# Patient Record
Sex: FEMALE | Race: BLACK OR AFRICAN AMERICAN | NOT HISPANIC OR LATINO | URBAN - METROPOLITAN AREA
[De-identification: names, ages, dates, MRNs, and addresses within clinical notes are randomized per-mention and may not be internally consistent; named-entity substitution may affect disease eponyms.]

---

## 2023-12-12 ENCOUNTER — HOSPITAL ENCOUNTER (INPATIENT)
Facility: HOSPITAL | Age: 38
LOS: 4 days | Discharge: HOME HEALTH | DRG: 871 | End: 2023-12-16
Attending: EMERGENCY MEDICINE | Admitting: EMERGENCY MEDICINE
Payer: MEDICAID

## 2023-12-12 DIAGNOSIS — E10.10 DIABETIC KETOACIDOSIS WITHOUT COMA ASSOCIATED WITH TYPE 1 DIABETES MELLITUS: ICD-10-CM

## 2023-12-12 DIAGNOSIS — E87.20 LACTIC ACIDOSIS: Primary | ICD-10-CM

## 2023-12-12 DIAGNOSIS — R07.9 CHEST PAIN: ICD-10-CM

## 2023-12-12 DIAGNOSIS — R73.9 HYPERGLYCEMIA: ICD-10-CM

## 2023-12-12 PROBLEM — E11.10 DKA (DIABETIC KETOACIDOSIS): Status: ACTIVE | Noted: 2023-12-12

## 2023-12-12 PROBLEM — R65.20 SEVERE SEPSIS: Status: ACTIVE | Noted: 2023-12-12

## 2023-12-12 PROBLEM — N17.9 AKI (ACUTE KIDNEY INJURY): Status: ACTIVE | Noted: 2023-12-12

## 2023-12-12 PROBLEM — G93.41 ACUTE METABOLIC ENCEPHALOPATHY: Status: ACTIVE | Noted: 2023-12-12

## 2023-12-12 PROBLEM — A41.9 SEVERE SEPSIS: Status: ACTIVE | Noted: 2023-12-12

## 2023-12-12 PROBLEM — E87.29 HIGH ANION GAP METABOLIC ACIDOSIS: Status: ACTIVE | Noted: 2023-12-12

## 2023-12-12 PROBLEM — D50.9 MICROCYTIC ANEMIA: Status: ACTIVE | Noted: 2023-12-12

## 2023-12-12 PROBLEM — E87.5 HYPERKALEMIA: Status: ACTIVE | Noted: 2023-12-12

## 2023-12-12 PROBLEM — E83.39 HYPERPHOSPHATEMIA: Status: ACTIVE | Noted: 2023-12-12

## 2023-12-12 LAB
ALLENS TEST: ABNORMAL
AMPHET+METHAMPHET UR QL: ABNORMAL
ANISOCYTOSIS BLD QL SMEAR: SLIGHT
B-OH-BUTYR BLD STRIP-SCNC: 5.5 MMOL/L (ref 0–0.5)
BACTERIA #/AREA URNS AUTO: NORMAL /HPF
BARBITURATES UR QL SCN>200 NG/ML: NEGATIVE
BASOPHILS # BLD AUTO: 0.07 K/UL (ref 0–0.2)
BASOPHILS NFR BLD: 0.3 % (ref 0–1.9)
BENZODIAZ UR QL SCN>200 NG/ML: NEGATIVE
BILIRUB UR QL STRIP: NEGATIVE
BUN SERPL-MCNC: 49 MG/DL (ref 6–30)
BUN SERPL-MCNC: 59 MG/DL (ref 6–30)
BURR CELLS BLD QL SMEAR: ABNORMAL
BZE UR QL SCN: NEGATIVE
CANNABINOIDS UR QL SCN: NEGATIVE
CHLORIDE SERPL-SCNC: 104 MMOL/L (ref 95–110)
CHLORIDE SERPL-SCNC: 99 MMOL/L (ref 95–110)
CK SERPL-CCNC: 80 U/L (ref 20–180)
CLARITY UR REFRACT.AUTO: CLEAR
COLOR UR AUTO: COLORLESS
CREAT SERPL-MCNC: 1.1 MG/DL (ref 0.5–1.4)
CREAT SERPL-MCNC: 1.2 MG/DL (ref 0.5–1.4)
CREAT UR-MCNC: 20 MG/DL (ref 15–325)
DIFFERENTIAL METHOD: ABNORMAL
EOSINOPHIL # BLD AUTO: 0 K/UL (ref 0–0.5)
EOSINOPHIL NFR BLD: 0 % (ref 0–8)
ERYTHROCYTE [DISTWIDTH] IN BLOOD BY AUTOMATED COUNT: 15.9 % (ref 11.5–14.5)
ESTIMATED AVG GLUCOSE: 344 MG/DL (ref 68–131)
ETHANOL UR-MCNC: <10 MG/DL
GLUCOSE SERPL-MCNC: >700 MG/DL (ref 70–110)
GLUCOSE SERPL-MCNC: >700 MG/DL (ref 70–110)
GLUCOSE UR QL STRIP: ABNORMAL
HBA1C MFR BLD: 13.6 % (ref 4–5.6)
HCG INTACT+B SERPL-ACNC: <2.4 MIU/ML
HCO3 UR-SCNC: 6.8 MMOL/L (ref 24–28)
HCT VFR BLD AUTO: 34.1 % (ref 37–48.5)
HCT VFR BLD CALC: 34 %PCV (ref 36–54)
HCT VFR BLD CALC: 38 %PCV (ref 36–54)
HCV AB SERPL QL IA: NORMAL
HGB BLD-MCNC: 10.8 G/DL (ref 12–16)
HGB UR QL STRIP: NEGATIVE
HIV 1+2 AB+HIV1 P24 AG SERPL QL IA: NORMAL
HYPOCHROMIA BLD QL SMEAR: ABNORMAL
IMM GRANULOCYTES # BLD AUTO: 0.13 K/UL (ref 0–0.04)
IMM GRANULOCYTES NFR BLD AUTO: 0.5 % (ref 0–0.5)
INFLUENZA A, MOLECULAR: NOT DETECTED
INFLUENZA B, MOLECULAR: NOT DETECTED
KETONES UR QL STRIP: ABNORMAL
LACTATE SERPL-SCNC: 4 MMOL/L (ref 0.5–2.2)
LDH SERPL L TO P-CCNC: 4.46 MMOL/L (ref 0.5–2.2)
LDH SERPL L TO P-CCNC: 4.76 MMOL/L (ref 0.5–2.2)
LEUKOCYTE ESTERASE UR QL STRIP: NEGATIVE
LYMPHOCYTES # BLD AUTO: 1 K/UL (ref 1–4.8)
LYMPHOCYTES NFR BLD: 4.3 % (ref 18–48)
MAGNESIUM SERPL-MCNC: 2.6 MG/DL (ref 1.6–2.6)
MCH RBC QN AUTO: 24.5 PG (ref 27–31)
MCHC RBC AUTO-ENTMCNC: 31.7 G/DL (ref 32–36)
MCV RBC AUTO: 77 FL (ref 82–98)
METHADONE UR QL SCN>300 NG/ML: NEGATIVE
MICROSCOPIC COMMENT: NORMAL
MONOCYTES # BLD AUTO: 0.3 K/UL (ref 0.3–1)
MONOCYTES NFR BLD: 1.2 % (ref 4–15)
NEUTROPHILS # BLD AUTO: 22.4 K/UL (ref 1.8–7.7)
NEUTROPHILS NFR BLD: 93.7 % (ref 38–73)
NITRITE UR QL STRIP: NEGATIVE
NRBC BLD-RTO: 0 /100 WBC
OPIATES UR QL SCN: NEGATIVE
OVALOCYTES BLD QL SMEAR: ABNORMAL
PCO2 BLDA: 21.1 MMHG (ref 35–45)
PCP UR QL SCN>25 NG/ML: NEGATIVE
PH SMN: 7.12 [PH] (ref 7.35–7.45)
PH UR STRIP: 5 [PH] (ref 5–8)
PHOSPHATE SERPL-MCNC: 7.1 MG/DL (ref 2.7–4.5)
PLATELET # BLD AUTO: 396 K/UL (ref 150–450)
PLATELET BLD QL SMEAR: ABNORMAL
PMV BLD AUTO: 10.9 FL (ref 9.2–12.9)
PO2 BLDA: 42 MMHG (ref 40–60)
POC BE: -23 MMOL/L
POC IONIZED CALCIUM: 0.96 MMOL/L (ref 1.06–1.42)
POC IONIZED CALCIUM: 1.16 MMOL/L (ref 1.06–1.42)
POC SATURATED O2: 63 % (ref 95–100)
POC TCO2 (MEASURED): 7 MMOL/L (ref 23–29)
POC TCO2 (MEASURED): 9 MMOL/L (ref 23–27)
POC TCO2: 7 MMOL/L (ref 24–29)
POCT GLUCOSE: 136 MG/DL (ref 70–110)
POCT GLUCOSE: 169 MG/DL (ref 70–110)
POCT GLUCOSE: 176 MG/DL (ref 70–110)
POCT GLUCOSE: 207 MG/DL (ref 70–110)
POCT GLUCOSE: 223 MG/DL (ref 70–110)
POCT GLUCOSE: 250 MG/DL (ref 70–110)
POCT GLUCOSE: 318 MG/DL (ref 70–110)
POCT GLUCOSE: >500 MG/DL (ref 70–110)
POIKILOCYTOSIS BLD QL SMEAR: SLIGHT
POLYCHROMASIA BLD QL SMEAR: ABNORMAL
POTASSIUM BLD-SCNC: 4.8 MMOL/L (ref 3.5–5.1)
POTASSIUM BLD-SCNC: 6.3 MMOL/L (ref 3.5–5.1)
PROT UR QL STRIP: NEGATIVE
PROVIDER CREDENTIALS: ABNORMAL
PROVIDER CREDENTIALS: ABNORMAL
PROVIDER NOTIFIED: ABNORMAL
PROVIDER NOTIFIED: ABNORMAL
RBC # BLD AUTO: 4.41 M/UL (ref 4–5.4)
RBC #/AREA URNS AUTO: 1 /HPF (ref 0–4)
RSV AG BY MOLECULAR METHOD: NOT DETECTED
SAMPLE: ABNORMAL
SARS-COV-2 RNA RESP QL NAA+PROBE: NOT DETECTED
SITE: ABNORMAL
SODIUM BLD-SCNC: 126 MMOL/L (ref 136–145)
SODIUM BLD-SCNC: 128 MMOL/L (ref 136–145)
SODIUM UR-SCNC: 29 MMOL/L (ref 20–250)
SP GR UR STRIP: 1.02 (ref 1–1.03)
SQUAMOUS #/AREA URNS AUTO: 5 /HPF
TIME NOTIFIED: 1103
TIME NOTIFIED: 814
TOXICOLOGY INFORMATION: ABNORMAL
URN SPEC COLLECT METH UR: ABNORMAL
UUN UR-MCNC: 272 MG/DL (ref 140–1050)
VERBAL RESULT READBACK PERFORMED: YES
VERBAL RESULT READBACK PERFORMED: YES
WBC # BLD AUTO: 23.95 K/UL (ref 3.9–12.7)
WBC #/AREA URNS AUTO: 1 /HPF (ref 0–5)
YEAST UR QL AUTO: NORMAL

## 2023-12-12 PROCEDURE — 84300 ASSAY OF URINE SODIUM: CPT | Performed by: EMERGENCY MEDICINE

## 2023-12-12 PROCEDURE — S5010 5% DEXTROSE AND 0.45% SALINE: HCPCS

## 2023-12-12 PROCEDURE — 99900035 HC TECH TIME PER 15 MIN (STAT)

## 2023-12-12 PROCEDURE — 99291 PR CRITICAL CARE, E/M 30-74 MINUTES: ICD-10-PCS | Mod: ,,, | Performed by: INTERNAL MEDICINE

## 2023-12-12 PROCEDURE — 99291 CRITICAL CARE FIRST HOUR: CPT | Mod: ,,, | Performed by: INTERNAL MEDICINE

## 2023-12-12 PROCEDURE — 63600175 PHARM REV CODE 636 W HCPCS: Performed by: EMERGENCY MEDICINE

## 2023-12-12 PROCEDURE — 84540 ASSAY OF URINE/UREA-N: CPT | Performed by: EMERGENCY MEDICINE

## 2023-12-12 PROCEDURE — 83605 ASSAY OF LACTIC ACID: CPT

## 2023-12-12 PROCEDURE — 94640 AIRWAY INHALATION TREATMENT: CPT

## 2023-12-12 PROCEDURE — 82550 ASSAY OF CK (CPK): CPT

## 2023-12-12 PROCEDURE — 83036 HEMOGLOBIN GLYCOSYLATED A1C: CPT

## 2023-12-12 PROCEDURE — 25000003 PHARM REV CODE 250

## 2023-12-12 PROCEDURE — 63600175 PHARM REV CODE 636 W HCPCS

## 2023-12-12 PROCEDURE — 85025 COMPLETE CBC W/AUTO DIFF WBC: CPT | Performed by: EMERGENCY MEDICINE

## 2023-12-12 PROCEDURE — 0241U SARS-COV2 (COVID) WITH FLU/RSV BY PCR: CPT | Performed by: STUDENT IN AN ORGANIZED HEALTH CARE EDUCATION/TRAINING PROGRAM

## 2023-12-12 PROCEDURE — 80307 DRUG TEST PRSMV CHEM ANLYZR: CPT | Performed by: EMERGENCY MEDICINE

## 2023-12-12 PROCEDURE — 93010 ELECTROCARDIOGRAM REPORT: CPT | Mod: ,,, | Performed by: INTERNAL MEDICINE

## 2023-12-12 PROCEDURE — 96367 TX/PROPH/DG ADDL SEQ IV INF: CPT

## 2023-12-12 PROCEDURE — 96366 THER/PROPH/DIAG IV INF ADDON: CPT

## 2023-12-12 PROCEDURE — 20000000 HC ICU ROOM

## 2023-12-12 PROCEDURE — 86803 HEPATITIS C AB TEST: CPT | Performed by: PHYSICIAN ASSISTANT

## 2023-12-12 PROCEDURE — 94761 N-INVAS EAR/PLS OXIMETRY MLT: CPT | Mod: XB

## 2023-12-12 PROCEDURE — 81001 URINALYSIS AUTO W/SCOPE: CPT | Mod: XB | Performed by: EMERGENCY MEDICINE

## 2023-12-12 PROCEDURE — 80048 BASIC METABOLIC PNL TOTAL CA: CPT | Mod: XB | Performed by: INTERNAL MEDICINE

## 2023-12-12 PROCEDURE — 83735 ASSAY OF MAGNESIUM: CPT | Performed by: EMERGENCY MEDICINE

## 2023-12-12 PROCEDURE — 82803 BLOOD GASES ANY COMBINATION: CPT

## 2023-12-12 PROCEDURE — 99291 CRITICAL CARE FIRST HOUR: CPT

## 2023-12-12 PROCEDURE — 80048 BASIC METABOLIC PNL TOTAL CA: CPT | Mod: 91,XB

## 2023-12-12 PROCEDURE — 84100 ASSAY OF PHOSPHORUS: CPT | Performed by: EMERGENCY MEDICINE

## 2023-12-12 PROCEDURE — 87040 BLOOD CULTURE FOR BACTERIA: CPT | Mod: 59 | Performed by: EMERGENCY MEDICINE

## 2023-12-12 PROCEDURE — 25000003 PHARM REV CODE 250: Performed by: EMERGENCY MEDICINE

## 2023-12-12 PROCEDURE — 96375 TX/PRO/DX INJ NEW DRUG ADDON: CPT

## 2023-12-12 PROCEDURE — 80053 COMPREHEN METABOLIC PANEL: CPT | Performed by: EMERGENCY MEDICINE

## 2023-12-12 PROCEDURE — 93005 ELECTROCARDIOGRAM TRACING: CPT

## 2023-12-12 PROCEDURE — 84702 CHORIONIC GONADOTROPIN TEST: CPT

## 2023-12-12 PROCEDURE — 93010 EKG 12-LEAD: ICD-10-PCS | Mod: ,,, | Performed by: INTERNAL MEDICINE

## 2023-12-12 PROCEDURE — 87389 HIV-1 AG W/HIV-1&-2 AB AG IA: CPT | Performed by: PHYSICIAN ASSISTANT

## 2023-12-12 PROCEDURE — 82010 KETONE BODYS QUAN: CPT | Performed by: EMERGENCY MEDICINE

## 2023-12-12 PROCEDURE — 25000242 PHARM REV CODE 250 ALT 637 W/ HCPCS: Performed by: EMERGENCY MEDICINE

## 2023-12-12 PROCEDURE — 96361 HYDRATE IV INFUSION ADD-ON: CPT

## 2023-12-12 PROCEDURE — 96365 THER/PROPH/DIAG IV INF INIT: CPT

## 2023-12-12 RX ORDER — ONDANSETRON 2 MG/ML
4 INJECTION INTRAMUSCULAR; INTRAVENOUS EVERY 6 HOURS PRN
Status: DISCONTINUED | OUTPATIENT
Start: 2023-12-12 | End: 2023-12-16 | Stop reason: HOSPADM

## 2023-12-12 RX ORDER — HEPARIN SODIUM 5000 [USP'U]/ML
5000 INJECTION, SOLUTION INTRAVENOUS; SUBCUTANEOUS EVERY 8 HOURS
Status: DISCONTINUED | OUTPATIENT
Start: 2023-12-12 | End: 2023-12-13

## 2023-12-12 RX ORDER — SODIUM CHLORIDE, SODIUM LACTATE, POTASSIUM CHLORIDE, CALCIUM CHLORIDE 600; 310; 30; 20 MG/100ML; MG/100ML; MG/100ML; MG/100ML
INJECTION, SOLUTION INTRAVENOUS CONTINUOUS
Status: DISCONTINUED | OUTPATIENT
Start: 2023-12-12 | End: 2023-12-13

## 2023-12-12 RX ORDER — ACETAMINOPHEN 325 MG/1
650 TABLET ORAL EVERY 4 HOURS PRN
Status: DISCONTINUED | OUTPATIENT
Start: 2023-12-12 | End: 2023-12-14

## 2023-12-12 RX ORDER — SODIUM CHLORIDE 0.9 % (FLUSH) 0.9 %
10 SYRINGE (ML) INJECTION
Status: DISCONTINUED | OUTPATIENT
Start: 2023-12-12 | End: 2023-12-16 | Stop reason: HOSPADM

## 2023-12-12 RX ORDER — DEXTROSE MONOHYDRATE AND SODIUM CHLORIDE 5; .45 G/100ML; G/100ML
INJECTION, SOLUTION INTRAVENOUS CONTINUOUS PRN
Status: DISCONTINUED | OUTPATIENT
Start: 2023-12-12 | End: 2023-12-12

## 2023-12-12 RX ORDER — SODIUM CHLORIDE 9 MG/ML
125 INJECTION, SOLUTION INTRAVENOUS CONTINUOUS
Status: DISCONTINUED | OUTPATIENT
Start: 2023-12-12 | End: 2023-12-12

## 2023-12-12 RX ORDER — SODIUM CHLORIDE 9 MG/ML
1000 INJECTION, SOLUTION INTRAVENOUS CONTINUOUS
Status: DISCONTINUED | OUTPATIENT
Start: 2023-12-12 | End: 2023-12-12

## 2023-12-12 RX ORDER — ALBUTEROL SULFATE 2.5 MG/.5ML
2.5 SOLUTION RESPIRATORY (INHALATION)
Status: COMPLETED | OUTPATIENT
Start: 2023-12-12 | End: 2023-12-12

## 2023-12-12 RX ORDER — DEXTROSE MONOHYDRATE AND SODIUM CHLORIDE 5; .45 G/100ML; G/100ML
125 INJECTION, SOLUTION INTRAVENOUS CONTINUOUS PRN
Status: DISCONTINUED | OUTPATIENT
Start: 2023-12-12 | End: 2023-12-14

## 2023-12-12 RX ADMIN — INSULIN DETEMIR 10 UNITS: 100 INJECTION, SOLUTION SUBCUTANEOUS at 08:12

## 2023-12-12 RX ADMIN — DEXTROSE AND SODIUM CHLORIDE 125 ML/HR: 5; 450 INJECTION, SOLUTION INTRAVENOUS at 08:12

## 2023-12-12 RX ADMIN — ALBUTEROL SULFATE 2.5 MG: 2.5 SOLUTION RESPIRATORY (INHALATION) at 07:12

## 2023-12-12 RX ADMIN — INSULIN HUMAN 0.1 UNITS/KG/HR: 1 INJECTION, SOLUTION INTRAVENOUS at 08:12

## 2023-12-12 RX ADMIN — INSULIN HUMAN 0.2 UNITS/KG/HR: 1 INJECTION, SOLUTION INTRAVENOUS at 01:12

## 2023-12-12 RX ADMIN — HEPARIN SODIUM 5000 UNITS: 5000 INJECTION INTRAVENOUS; SUBCUTANEOUS at 08:12

## 2023-12-12 RX ADMIN — SODIUM CHLORIDE, POTASSIUM CHLORIDE, SODIUM LACTATE AND CALCIUM CHLORIDE: 600; 310; 30; 20 INJECTION, SOLUTION INTRAVENOUS at 08:12

## 2023-12-12 RX ADMIN — PIPERACILLIN SODIUM AND TAZOBACTAM SODIUM 4.5 G: 4; .5 INJECTION, POWDER, FOR SOLUTION INTRAVENOUS at 11:12

## 2023-12-12 RX ADMIN — INSULIN HUMAN 0.1 UNITS/KG/HR: 1 INJECTION, SOLUTION INTRAVENOUS at 06:12

## 2023-12-12 RX ADMIN — VANCOMYCIN HYDROCHLORIDE 1250 MG: 1.25 INJECTION, POWDER, LYOPHILIZED, FOR SOLUTION INTRAVENOUS at 09:12

## 2023-12-12 RX ADMIN — SODIUM CHLORIDE 1000 ML: 9 INJECTION, SOLUTION INTRAVENOUS at 08:12

## 2023-12-12 RX ADMIN — SODIUM CHLORIDE, POTASSIUM CHLORIDE, SODIUM LACTATE AND CALCIUM CHLORIDE: 600; 310; 30; 20 INJECTION, SOLUTION INTRAVENOUS at 05:12

## 2023-12-12 RX ADMIN — PIPERACILLIN SODIUM AND TAZOBACTAM SODIUM 4.5 G: 4; .5 INJECTION, POWDER, FOR SOLUTION INTRAVENOUS at 08:12

## 2023-12-12 NOTE — ED NOTES
I-STAT Chem-8+ Results:   Value Reference Range   Sodium 128 136-145 mmol/L   Potassium  6.3 3.5-5.1 mmol/L   Chloride 104  mmol/L   Ionized Calcium 0.96 1.06-1.42 mmol/L   CO2 (measured) 9 23-29 mmol/L   Glucose >700  mg/dL   BUN 59 6-30 mg/dL   Creatinine 1.1 0.5-1.4 mg/dL   Hematocrit 38 36-54%

## 2023-12-12 NOTE — ASSESSMENT & PLAN NOTE
BG >500 on multiple POCT glucose. BHB elevated. A1c of 13.6.     - start insulin gtt  - BMP q4  - replete K as needed  - consider bicarb if worsening acidosis  - nutrition consulted

## 2023-12-12 NOTE — ASSESSMENT & PLAN NOTE
Phos 7.1 on admission. Likely elevated in the setting of metabolic acidosis and renal dysfunction    - trend

## 2023-12-12 NOTE — HPI
This is a 36 year old lady with history of diabetes who presented to the ED for altered mental status. History obtained primarily from chart review. Per EMS pt was 98% on room air with wheezing. Duoneb given enroute with improvement. Per EMS pt CBG came back as greater than 600 and is 913 on admission. Pt is unconscious and non verbal in the ED, she is not answering questions and is unrousable. Per ED note her family was concerned because she seemed a bit out of it. Per chart review she has not been taking her insulin lately.    In the ED, patietn was tachycardia, tachypnea, elevated WBC, and elevated lactic in the setting of DKA. Unclear source of infection as UA and CXR were unremarkable. Patient was admitted to ICU for further management.

## 2023-12-12 NOTE — ED NOTES
Care assumed from DONOVAN Rubi and Courtney ED Paramedic    APPEARANCE: drowsy/lethargic, unkempt. Unable to quantify pain score at this time. Remains on cont cardiac monitoring, auto BP cuff, and cont pulse ox. Changed into hospital gown. Bed in lowest and locked position w/ side rails up x2.   SKIN: warm, dry. +scattered scabbed lesions to BUE and BLE. Mucous membranes dry, acyanotic.    MUSCULOSKELETAL: +generalized weakness. Patient moving all extremities spontaneously, no obvious swelling or deformities noted. Ambulates independently.  RESPIRATORY: +tachypnea. Airway open and patent. Denies shortness of breath. Respirations unlabored.   CARDIAC: +tachycardia. Denies CP, 2+ distal pulses; no peripheral edema  ABDOMEN: S/ND/NT, Denies nausea. +polyphagia and polydipsia   : Pt voids spontaneously, +polyuria   NEUROLOGIC: AAO x 3; arousal to noxious stimuli. Follows commands appropriately. Equal strength in all extremities; denies numbness/tingling.     Patient remains on female external urinary catheter for urine sample collection.

## 2023-12-12 NOTE — ED PROVIDER NOTES
Encounter Date: 12/12/2023       History     Chief Complaint   Patient presents with    Altered Mental Status     Pt coming from home after family called EMS for altered mental status. Per EMS pt was 98% on room air with wheezing. Duoneb given enroute with improvement. Per EMS pt CBG came back as greater than 600. Pt non-verbal with EMS     36-year-old female with history of diabetes comes in concerned about her sugar.  She states her sugars high and she is thirsty.  Her family was concerned because she seemed a bit out of it.  Denies pain in chest abdomen or head.  No fevers at home.  She states she has not been taking her insulin lately.        Review of patient's allergies indicates:  Not on File  No past medical history on file.  No past surgical history on file.  No family history on file.     Review of Systems    Physical Exam     Initial Vitals [12/12/23 0621]   BP Pulse Resp Temp SpO2   (!) 144/85 (!) 112 (!) 24 98.9 °F (37.2 °C) 98 %      MAP       --         Physical Exam    Constitutional:   Patient is alert and conversant but hard to direct her conversation.  She looks dry and tachypneic   HENT:   Head: Normocephalic.   Eyes: Conjunctivae are normal. Pupils are equal, round, and reactive to light.   Neck: Neck supple. No JVD present.   Normal range of motion.  Cardiovascular:  Regular rhythm and normal heart sounds.           Tachycardic   Pulmonary/Chest: Breath sounds normal. No respiratory distress. She has no wheezes. She has no rhonchi. She has no rales.   Abdominal: Abdomen is soft. Bowel sounds are normal. She exhibits no distension. There is no abdominal tenderness. There is no rebound.   Musculoskeletal:         General: No edema. Normal range of motion.      Cervical back: Normal range of motion and neck supple.     Neurological: She is alert. She has normal strength. No cranial nerve deficit or sensory deficit.   Skin: Skin is warm. No rash noted.       Procedures  Labs Reviewed   CBC W/ AUTO  DIFFERENTIAL - Abnormal; Notable for the following components:       Result Value    WBC 23.95 (*)     Hemoglobin 10.8 (*)     Hematocrit 34.1 (*)     MCV 77 (*)     MCH 24.5 (*)     MCHC 31.7 (*)     RDW 15.9 (*)     Gran # (ANC) 22.4 (*)     Immature Grans (Abs) 0.13 (*)     Gran % 93.7 (*)     Lymph % 4.3 (*)     Mono % 1.2 (*)     All other components within normal limits   COMPREHENSIVE METABOLIC PANEL - Abnormal; Notable for the following components:    Sodium 132 (*)     Potassium 6.6 (*)     Chloride 94 (*)     CO2 5 (*)     Glucose 914 (*)     BUN 55 (*)     Creatinine 2.0 (*)     Albumin 3.4 (*)     Alkaline Phosphatase 265 (*)     ALT 53 (*)     eGFR 32.4 (*)     Anion Gap 33 (*)     All other components within normal limits   URINALYSIS, REFLEX TO URINE CULTURE - Abnormal; Notable for the following components:    Color, UA Colorless (*)     Glucose, UA 4+ (*)     Ketones, UA 2+ (*)     All other components within normal limits    Narrative:     Specimen Source->Urine   PHOSPHORUS - Abnormal; Notable for the following components:    Phosphorus 7.1 (*)     All other components within normal limits   BETA - HYDROXYBUTYRATE, SERUM - Abnormal; Notable for the following components:    Beta-Hydroxybutyrate 5.5 (*)     All other components within normal limits   ISTAT PROCEDURE - Abnormal; Notable for the following components:    POC PH 7.119 (*)     POC PCO2 21.1 (*)     POC HCO3 6.8 (*)     POC BE -23 (*)     POC TCO2 7 (*)     All other components within normal limits   ISTAT LACTATE - Abnormal; Notable for the following components:    POC Lactate 4.46 (*)     All other components within normal limits   ISTAT PROCEDURE - Abnormal; Notable for the following components:    POC Glucose >700 (*)     POC BUN 59 (*)     POC Sodium 128 (*)     POC Potassium 6.3 (*)     POC TCO2 (MEASURED) 9 (*)     POC Ionized Calcium 0.96 (*)     All other components within normal limits   POCT GLUCOSE - Abnormal; Notable for the  following components:    POCT Glucose >500 (*)     All other components within normal limits   ISTAT LACTATE - Abnormal; Notable for the following components:    POC Lactate 4.76 (*)     All other components within normal limits   ISTAT PROCEDURE - Abnormal; Notable for the following components:    POC Glucose >700 (*)     POC BUN 49 (*)     POC Sodium 126 (*)     POC TCO2 (MEASURED) 7 (*)     POC Hematocrit 34 (*)     All other components within normal limits   CULTURE, BLOOD   CULTURE, BLOOD   MAGNESIUM   HIV 1 / 2 ANTIBODY    Narrative:     Release to patient->Immediate   HEPATITIS C ANTIBODY    Narrative:     Release to patient->Immediate   BASIC METABOLIC PANEL   MAGNESIUM   PHOSPHORUS   URINALYSIS MICROSCOPIC    Narrative:     Specimen Source->Urine   BASIC METABOLIC PANEL   HEMOGLOBIN A1C   LACTIC ACID, PLASMA   BASIC METABOLIC PANEL   HCG, QUANTITATIVE   POCT URINE PREGNANCY   ISTAT CHEM8   POCT GLUCOSE MONITORING CONTINUOUS     EKG Readings: (Independently Interpreted)   Sinus tachycardia 113 without acute ischemic changes.  No signs of hyperkalemia.     ECG Results              EKG 12-lead (Final result)  Result time 12/12/23 10:43:46      Final result by Interface, Lab In [facility] (12/12/23 10:43:46)                   Narrative:    Test Reason : R73.9,    Vent. Rate : 113 BPM     Atrial Rate : 113 BPM     P-R Int : 162 ms          QRS Dur : 088 ms      QT Int : 350 ms       P-R-T Axes : 074 090 039 degrees     QTc Int : 480 ms    Probably  Sinus tachycardia  Possible Left atrial enlargement  Rightward axis  Possible  Anteroseptal infarct ,age undetermined  Abnormal ECG  No previous ECGs available  Confirmed by Venkat AGUILAR MD ([id number]) on 12/12/2023 10:43:36 AM    Referred By: [email or web address]   SELF           Confirmed By:Venkat AGUILAR MD                                  Imaging Results              X-Ray Chest AP Portable (Final result)  Result time 12/12/23 07:04:41      Final result by Natanael Reeder MD (12/12/23  07:04:41)                   Impression:      No significant intrathoracic abnormality.      Electronically signed by: Natanael Reeder MD  Date:    12/12/2023  Time:    07:04               Narrative:    EXAMINATION:  XR CHEST AP PORTABLE    CLINICAL HISTORY:  hyperglycemia;    TECHNIQUE:  One view    COMPARISON:  No prior chest radiographs are currently available for comparison purposes.  Clinical information of altered mental status/hyperglycemia.    FINDINGS:  Heart size is normal, as is the appearance of the pulmonary vascularity.  Lung zones are clear, and are free of significant airspace consolidation or volume loss.  No pleural fluid.  No hilar or mediastinal mass lesion.  No pneumothorax.                                       Medications   sodium chloride 0.9% flush 10 mL (has no administration in time range)   dextrose 10% bolus 125 mL 125 mL (has no administration in time range)   sodium chloride 0.9% flush 10 mL (has no administration in time range)   heparin (porcine) injection 5,000 Units (has no administration in time range)   sodium chloride 0.9% flush 10 mL (has no administration in time range)   0.9%  NaCl infusion (has no administration in time range)   dextrose 5 % and 0.45 % NaCl infusion (has no administration in time range)   ondansetron injection 4 mg (has no administration in time range)   acetaminophen tablet 650 mg (has no administration in time range)   insulin regular in 0.9 % NaCl 100 unit/100 mL (1 unit/mL) infusion (has no administration in time range)   dextrose 10% bolus 125 mL 125 mL (has no administration in time range)   dextrose 10% bolus 250 mL 250 mL (has no administration in time range)   vancomycin - pharmacy to dose (has no administration in time range)   piperacillin-tazobactam (ZOSYN) 4.5 g in dextrose 5 % in water (D5W) 100 mL IVPB (MB+) (has no administration in time range)   sodium chloride 0.9% bolus 1,000 mL 1,000 mL (1,000 mLs Intravenous Bolus from Bag 12/12/23 0800)    insulin regular bolus from bag/infusion 5.2 Units 5.2 mL (5.2 Units Intravenous Bolus from Bag 12/12/23 0830)   albuterol sulfate nebulizer solution 2.5 mg (2.5 mg Nebulization Given 12/12/23 8177)   vancomycin 1,250 mg in dextrose 5 % (D5W) 250 mL IVPB (Vial-Mate) (0 mg Intravenous Stopped 12/12/23 1154)   piperacillin-tazobactam (ZOSYN) 4.5 g in dextrose 5 % in water (D5W) 100 mL IVPB (MB+) (0 g Intravenous Stopped 12/12/23 0906)   sodium chloride 0.9% bolus 1,000 mL 1,000 mL (0 mLs Intravenous Stopped 12/12/23 1152)     Medical Decision Making  Patient with signs and symptoms concerning for DKA.  I-STAT reveals a pH of 7.1.  PCO2 is 20.  She has a potassium of 6.  She has markedly elevated blood sugar.    Insulin infusion with bolus was ordered.  Albuterol nebs were given for hyperkalemia.    White count of 23,000.  Lactate of 4.  2 L of normal saline ordered.  Broad-spectrum IV antibiotics ordered.  Chest x-ray showed no infiltrate.  Urinalysis pending.  No other obvious source of infection seen.    I consulted critical care for persistent lactic acidosis.  Chemistries are pending.    Amount and/or Complexity of Data Reviewed  Labs: ordered.  Radiology: ordered.    Risk  Prescription drug management.  Decision regarding hospitalization.              Attending Attestation:         Attending Critical Care:   Critical Care Times:   Direct Patient Care (initial evaluation, reassessments, and time considering the case)................................................................32 minutes.   Additional History from reviewing old medical records or taking additional history from the family, EMS, PCP, etc.......................3 minutes.   Ordering, Reviewing, and Interpreting Diagnostic Studies...............................................................................................................3 minutes.    Documentation..................................................................................................................................................................................4 minutes.   Consultation with other Physicians. .................................................................................................................................................4 minutes.   ==============================================================  Total Critical Care Time - exclusive of procedural time: 46 minutes.  ==============================================================                                 Clinical Impression:  Final diagnoses:  [R73.9] Hyperglycemia  [E87.20] Lactic acidosis (Primary)  [E10.10] Diabetic ketoacidosis without coma associated with type 1 diabetes mellitus          ED Disposition Condition    Admit                 Silvestre Pulliam MD  12/12/23 1246

## 2023-12-12 NOTE — SUBJECTIVE & OBJECTIVE
No past medical history on file.    No past surgical history on file.    Review of patient's allergies indicates:  Not on File    Family History    None       Tobacco Use    Smoking status: Not on file    Smokeless tobacco: Not on file   Substance and Sexual Activity    Alcohol use: Not on file    Drug use: Not on file    Sexual activity: Not on file      Review of Systems   Unable to perform ROS: Acuity of condition (pt unconscious)     Objective:     Vital Signs (Most Recent):  Temp: 98.9 °F (37.2 °C) (12/12/23 0621)  Pulse: (!) 111 (12/12/23 1201)  Resp: (!) 29 (12/12/23 1102)  BP: (!) 163/100 (12/12/23 1201)  SpO2: 100 % (12/12/23 1201) Vital Signs (24h Range):  Temp:  [98.9 °F (37.2 °C)] 98.9 °F (37.2 °C)  Pulse:  [] 111  Resp:  [22-29] 29  SpO2:  [95 %-100 %] 100 %  BP: (113-163)/() 163/100   Weight: 52 kg (114 lb 10.2 oz)  There is no height or weight on file to calculate BMI.      Intake/Output Summary (Last 24 hours) at 12/12/2023 1246  Last data filed at 12/12/2023 1235  Gross per 24 hour   Intake 1400.5 ml   Output --   Net 1400.5 ml          Physical Exam  Constitutional:       General: She is not in acute distress.     Appearance: Normal appearance. She is ill-appearing and diaphoretic.      Comments: disheveled   HENT:      Head: Normocephalic and atraumatic.      Right Ear: External ear normal.      Left Ear: External ear normal.      Nose: Nose normal. No congestion or rhinorrhea.      Mouth/Throat:      Mouth: Mucous membranes are dry.      Pharynx: Oropharynx is clear. No oropharyngeal exudate or posterior oropharyngeal erythema.   Eyes:      General: No scleral icterus.     Extraocular Movements: Extraocular movements intact.      Conjunctiva/sclera: Conjunctivae normal.   Neck:      Vascular: No carotid bruit.   Cardiovascular:      Rate and Rhythm: Regular rhythm. Tachycardia present.      Pulses: Normal pulses.      Heart sounds: Normal heart sounds. No murmur heard.     No friction  rub.   Pulmonary:      Effort: Pulmonary effort is normal. No respiratory distress.      Breath sounds: Normal breath sounds. No stridor. No wheezing.   Chest:      Chest wall: No tenderness.   Abdominal:      General: Abdomen is flat. Bowel sounds are normal. There is no distension.      Palpations: There is no mass.      Tenderness: There is no right CVA tenderness, left CVA tenderness or guarding.   Musculoskeletal:         General: No swelling, tenderness or deformity. Normal range of motion.      Cervical back: Normal range of motion. No rigidity or tenderness.      Right lower leg: No edema.      Left lower leg: No edema.   Skin:     General: Skin is warm.      Capillary Refill: Capillary refill takes less than 2 seconds.      Coloration: Skin is not jaundiced or pale.      Findings: Bruising present. No erythema.   Neurological:      Mental Status: She is alert. She is disoriented.   Psychiatric:      Comments: Unable to assess            Vents:     Lines/Drains/Airways       Drain  Duration             Female External Urinary Catheter 12/12/23 0642 <1 day              Peripheral Intravenous Line  Duration                  Peripheral IV - Single Lumen 12/12/23 0643 22 G Left Hand <1 day         Peripheral IV - Single Lumen 12/12/23 0807 20 G Left Upper Arm <1 day         Peripheral IV - Single Lumen 12/12/23 1152 18 G Right Antecubital <1 day                  Significant Labs:    CBC/Anemia Profile:  Recent Labs   Lab 12/12/23  0709 12/12/23  0739 12/12/23  0945   WBC 23.95*  --   --    HGB 10.8*  --   --    HCT 34.1* 38 34*     --   --    MCV 77*  --   --    RDW 15.9*  --   --         Chemistries:  Recent Labs   Lab 12/12/23  0709   *   K 6.6*   CL 94*   CO2 5*   BUN 55*   CREATININE 2.0*   CALCIUM 9.7   ALBUMIN 3.4*   PROT 7.9   BILITOT 0.2   ALKPHOS 265*   ALT 53*   AST 21   MG 2.6   PHOS 7.1*       All pertinent labs within the past 24 hours have been reviewed.    Significant Imaging: I have  reviewed all pertinent imaging results/findings within the past 24 hours.

## 2023-12-12 NOTE — ASSESSMENT & PLAN NOTE
36 yoF with unknown past medical history presented with tachycardia, tachypnea, elevated WBC, and elevated lactic in the setting of DKA. Unclear source of infection as UA and CXR were unremarkable. History was minimal as patient is encephalopathic.     - IVF  - start Vanc and zosyn  - f/u Bcx and tailor antibiotics  - trend lactic

## 2023-12-12 NOTE — ASSESSMENT & PLAN NOTE
BG >500 on multiple POCT glucose. BHB elevated. A1c of 13.6. repeat labs showed closed gap and acidosis mostly resolved.     - patient transitioned to basal bolus insulin  - BMP q4  - replete K as needed  - nutrition consulted

## 2023-12-12 NOTE — ASSESSMENT & PLAN NOTE
Hgb of 10 and MCV of 77. No evidence of bleeding    - f/u iron studies  - trend Hgb and transfuse <7

## 2023-12-12 NOTE — CONSULTS
Patient to be admitted to the ICU. Full H&P to follow.     Mónica Stringer MD PGY-3  Critical Care Medicine

## 2023-12-12 NOTE — ASSESSMENT & PLAN NOTE
K of 6.6 on admission. EKG showed sinus tach and no peaked t waves noted.     resolved    - BMP q4  - calcium gluconate for cardioprotection

## 2023-12-12 NOTE — PROGRESS NOTES
Pharmacokinetic Initial Assessment: IV Vancomycin    Assessment/Plan:    Initiate IV vancomycin with loading dose of 1250 mg once with subsequent doses when random concentrations are less than 20 mcg/mL    Draw vancomycin random level with morning labs on 12/13. Desired empiric serum trough concentration is 10 to 20 mcg/mL    Pharmacy will continue to follow and monitor vancomycin.      Please contact pharmacy at extension [phone] with any questions regarding this assessment.     Thank you for the consult,   Ana Zambrano, PharmD, BCCCP                 Patient brief summary:  Wilfrido Elias is a 36 y.o. female initiated on antimicrobial therapy with IV vancomycin for treatment of suspected sepsis    Drug Allergies:   Review of patient's allergies indicates:  Not on File    Actual Body Weight:   52 kg     Renal Function:   CrCl estimated to be 33.3 mL/min     Dialysis Method (if applicable):  N/A    CBC (last 72 hours):  Recent Labs   Lab Result Units 12/12/23  0709 12/12/23  1247   WBC K/uL 23.95*  --    Hemoglobin g/dL 10.8*  --    Hemoglobin A1C %  --  13.6*   Hematocrit % 34.1*  --    Platelets K/uL 396  --    Gran % % 93.7*  --    Lymph % % 4.3*  --    Mono % % 1.2*  --    Eosinophil % % 0.0  --    Basophil % % 0.3  --    Differential Method  Automated  --        Metabolic Panel (last 72 hours):  Recent Labs   Lab Result Units 12/12/23  0709 12/12/23  1101 12/12/23  1257   Sodium mmol/L 132*  --  130*   Potassium mmol/L 6.6*  --  4.9   Chloride mmol/L 94*  --  96   CO2 mmol/L 5*  --  8*   Glucose mg/dL 914*  --  799*   Glucose, UA   --  4+*  --    BUN mg/dL 55*  --  58*   Creatinine mg/dL 2.0*  --  1.9*   Creatinine, Urine mg/dL  --  20.0  --    Albumin g/dL 3.4*  --   --    Total Bilirubin mg/dL 0.2  --   --    Alkaline Phosphatase U/L 265*  --   --    AST U/L 21  --   --    ALT U/L 53*  --   --    Magnesium mg/dL 2.6  --   --    Phosphorus mg/dL 7.1*  --   --        Drug levels (last 3 results):  No  "results for input(s): "VANCOMYCINRA", "VANCORANDOM", "VANCOMYCINPE", "VANCOPEAK", "VANCOMYCINTR", "VANCOTROUGH" in the last 72 hours.    Microbiologic Results:  Microbiology Results (last 7 days)       Procedure Component Value Units Date/Time    Blood Culture #1 **CANNOT BE ORDERED STAT** [[id number]] Collected: 12/12/23 0810    Order Status: Completed Specimen: Blood from Peripheral, Antecubital, Left Updated: 12/12/23 1515     Blood Culture, Routine No Growth to date    Blood Culture #2 **CANNOT BE ORDERED STAT** [[id number]] Collected: 12/12/23 0810    Order Status: Completed Specimen: Blood from Peripheral, Antecubital, Right Updated: 12/12/23 1515     Blood Culture, Routine No Growth to date            "

## 2023-12-12 NOTE — ED NOTES
"Pt transported to floor with RN & telemetry, Insulin infusing at 0.1 ordered rate. Attempted to handoff insulin drip with receiving floor RN DONOVAN Yanes refused "you can just chart it"- informed RN that it was ED protocol. Insulin drip was paused and disconnected from pt for ambulation purposes. ED charge Christos made aware.   "

## 2023-12-12 NOTE — ASSESSMENT & PLAN NOTE
36 yoF with unknown past medical history presented with tachycardia, tachypnea, elevated WBC, and elevated lactic in the setting of DKA. Unclear source of infection as UA and CXR were unremarkable. History was minimal as patient is encephalopathic. Likely source is multiple skin infections noted on patient.     - d/c vanc and zosyn and start doxycycline  - f/u Bcx and tailor antibiotics  - trend lactic

## 2023-12-12 NOTE — ASSESSMENT & PLAN NOTE
Cr 2.0. likely prerenal ANNA in the setting of dehydration and DKA.     - IVF  - f/u urine studies  - f/u renal US  - trend Cr  - avoid nephrotoxic medication and renally dose

## 2023-12-12 NOTE — H&P
[facility] Emergency Dept  Critical Care Medicine  History & Physical    Patient Name: Wilfrido Elias  MRN: [id number]  Admission Date: 12/12/2023  Hospital Length of Stay: 0 days  Code Status: Full Code  Attending Physician: Noah Leonard MD   Primary Care Provider: No primary care provider on file.   Principal Problem: Severe sepsis    Subjective:     HPI:  This is a 36 year old lady with history of diabetes who presented to the ED for altered mental status. History obtained primarily from chart review. Per EMS pt was 98% on room air with wheezing. Duoneb given enroute with improvement. Per EMS pt CBG came back as greater than 600 and is 913 on admission. Pt is unconscious and non verbal in the ED, she is not answering questions and is unrousable. Per ED note her family was concerned because she seemed a bit out of it. Per chart review she has not been taking her insulin lately.    In the ED, patietn was tachycardia, tachypnea, elevated WBC, and elevated lactic in the setting of DKA. Unclear source of infection as UA and CXR were unremarkable. Patient was admitted to ICU for further management.     Hospital/ICU Course:  No notes on file     No past medical history on file.    No past surgical history on file.    Review of patient's allergies indicates:  Not on File    Family History    None       Tobacco Use    Smoking status: Not on file    Smokeless tobacco: Not on file   Substance and Sexual Activity    Alcohol use: Not on file    Drug use: Not on file    Sexual activity: Not on file      Review of Systems   Unable to perform ROS: Acuity of condition (pt unconscious)     Objective:     Vital Signs (Most Recent):  Temp: 98.9 °F (37.2 °C) (12/12/23 0621)  Pulse: (!) 111 (12/12/23 1201)  Resp: (!) 29 (12/12/23 1102)  BP: (!) 163/100 (12/12/23 1201)  SpO2: 100 % (12/12/23 1201) Vital Signs (24h Range):  Temp:  [98.9 °F (37.2 °C)] 98.9 °F (37.2 °C)  Pulse:  [] 111  Resp:  [22-29] 29  SpO2:  [95 %-100 %]  100 %  BP: (113-163)/() 163/100   Weight: 52 kg (114 lb 10.2 oz)  There is no height or weight on file to calculate BMI.      Intake/Output Summary (Last 24 hours) at 12/12/2023 1246  Last data filed at 12/12/2023 1235  Gross per 24 hour   Intake 1400.5 ml   Output --   Net 1400.5 ml          Physical Exam  Constitutional:       General: She is not in acute distress.     Appearance: Normal appearance. She is ill-appearing and diaphoretic.      Comments: disheveled   HENT:      Head: Normocephalic and atraumatic.      Right Ear: External ear normal.      Left Ear: External ear normal.      Nose: Nose normal. No congestion or rhinorrhea.      Mouth/Throat:      Mouth: Mucous membranes are dry.      Pharynx: Oropharynx is clear. No oropharyngeal exudate or posterior oropharyngeal erythema.   Eyes:      General: No scleral icterus.     Extraocular Movements: Extraocular movements intact.      Conjunctiva/sclera: Conjunctivae normal.   Neck:      Vascular: No carotid bruit.   Cardiovascular:      Rate and Rhythm: Regular rhythm. Tachycardia present.      Pulses: Normal pulses.      Heart sounds: Normal heart sounds. No murmur heard.     No friction rub.   Pulmonary:      Effort: Pulmonary effort is normal. No respiratory distress.      Breath sounds: Normal breath sounds. No stridor. No wheezing.   Chest:      Chest wall: No tenderness.   Abdominal:      General: Abdomen is flat. Bowel sounds are normal. There is no distension.      Palpations: There is no mass.      Tenderness: There is no right CVA tenderness, left CVA tenderness or guarding.   Musculoskeletal:         General: No swelling, tenderness or deformity. Normal range of motion.      Cervical back: Normal range of motion. No rigidity or tenderness.      Right lower leg: No edema.      Left lower leg: No edema.   Skin:     General: Skin is warm.      Capillary Refill: Capillary refill takes less than 2 seconds.      Coloration: Skin is not jaundiced or  pale.      Findings: Bruising present. No erythema.   Neurological:      Mental Status: She is alert. She is disoriented.   Psychiatric:      Comments: Unable to assess            Vents:     Lines/Drains/Airways       Drain  Duration             Female External Urinary Catheter 12/12/23 0642 <1 day              Peripheral Intravenous Line  Duration                  Peripheral IV - Single Lumen 12/12/23 0643 22 G Left Hand <1 day         Peripheral IV - Single Lumen 12/12/23 0807 20 G Left Upper Arm <1 day         Peripheral IV - Single Lumen 12/12/23 1152 18 G Right Antecubital <1 day                  Significant Labs:    CBC/Anemia Profile:  Recent Labs   Lab 12/12/23  0709 12/12/23  0739 12/12/23  0945   WBC 23.95*  --   --    HGB 10.8*  --   --    HCT 34.1* 38 34*     --   --    MCV 77*  --   --    RDW 15.9*  --   --         Chemistries:  Recent Labs   Lab 12/12/23  0709   *   K 6.6*   CL 94*   CO2 5*   BUN 55*   CREATININE 2.0*   CALCIUM 9.7   ALBUMIN 3.4*   PROT 7.9   BILITOT 0.2   ALKPHOS 265*   ALT 53*   AST 21   MG 2.6   PHOS 7.1*       All pertinent labs within the past 24 hours have been reviewed.    Significant Imaging: I have reviewed all pertinent imaging results/findings within the past 24 hours.  Assessment/Plan:     Neuro  Acute metabolic encephalopathy  Likely 2/2 to sepsis or DKA.     - continue monitoring  - obtain UDS and TSH    Renal/  Hyperphosphatemia  Phos 7.1 on admission. Likely elevated in the setting of metabolic acidosis and renal dysfunction    - trend    Lactic acidosis  See Sepsis    High anion gap metabolic acidosis  Likely 2/2 DKA vs lactic acidosis. See DKA    ANNA (acute kidney injury)  Cr 2.0. likely prerenal ANNA in the setting of dehydration and DKA.     - IVF  - f/u urine studies  - f/u renal US  - trend Cr  - avoid nephrotoxic medication and renally dose    Hyperkalemia  K of 6.6 on admission. EKG showed sinus tach and no peaked t waves noted.     - continue  insulin gtt  - BMP q4  - calcium gluconate for cardioprotection    ID  * Severe sepsis  36 yoF with unknown past medical history presented with tachycardia, tachypnea, elevated WBC, and elevated lactic in the setting of DKA. Unclear source of infection as UA and CXR were unremarkable. History was minimal as patient is encephalopathic.     - IVF  - start Vanc and zosyn  - f/u Bcx and tailor antibiotics  - trend lactic    Oncology  Microcytic anemia  Hgb of 10 and MCV of 77. No evidence of bleeding    - f/u iron studies  - trend Hgb and transfuse <7    Endocrine  DKA (diabetic ketoacidosis)  BG >500 on multiple POCT glucose. BHB elevated. A1c of 13.6.     - start insulin gtt  - BMP q4  - replete K as needed  - consider bicarb if worsening acidosis  - nutrition consulted        Critical Care Daily Checklist:    A: Awake: RASS Goal/Actual Goal:    Actual:     B: Spontaneous Breathing Trial Performed?     C: SAT & SBT Coordinated?  NA                      D: Delirium: CAM-ICU     E: Early Mobility Performed? No   F: Feeding Goal:    Status:     Current Diet Order   Procedures    Diet NPO      AS: Analgesia/Sedation none   T: Thromboembolic Prophylaxis heparin   H: HOB > 300 Yes   U: Stress Ulcer Prophylaxis (if needed) none   G: Glucose Control Insulin gtt   B: Bowel Function     I: Indwelling Catheter (Lines & Spivey) Necessity Maintain current lines   D: De-escalation of Antimicrobials/Pharmacotherapies Start BS antibiotics    Plan for the day/ETD Admit to ICU    Code Status:  Family/Goals of Care: Full Code  ongoing       Critical secondary to Patient has a condition that poses threat to life and bodily function: Acute Renal Failure    N/A  No family history on file.     Critical care was time spent personally by me on the following activities: development of treatment plan with patient or surrogate and bedside caregivers, discussions with consultants, evaluation of patient's response to treatment, examination of  patient, ordering and performing treatments and interventions, ordering and review of laboratory studies, ordering and review of radiographic studies, pulse oximetry, re-evaluation of patient's condition. This critical care time did not overlap with that of any other provider or involve time for any procedures.     Mónica Stringer MD  Critical Care Medicine  [facility] - Emergency Dept

## 2023-12-12 NOTE — ASSESSMENT & PLAN NOTE
K of 6.6 on admission. EKG showed sinus tach and no peaked t waves noted.     - continue insulin gtt  - BMP q4  - calcium gluconate for cardioprotection

## 2023-12-13 LAB
ANION GAP SERPL CALC-SCNC: 10 MMOL/L (ref 8–16)
ANION GAP SERPL CALC-SCNC: 8 MMOL/L (ref 8–16)
BASOPHILS # BLD AUTO: 0.03 K/UL (ref 0–0.2)
BASOPHILS NFR BLD: 0.2 % (ref 0–1.9)
BUN SERPL-MCNC: 20 MG/DL (ref 6–20)
BUN SERPL-MCNC: 25 MG/DL (ref 6–20)
CALCIUM SERPL-MCNC: 8.7 MG/DL (ref 8.7–10.5)
CALCIUM SERPL-MCNC: 9.1 MG/DL (ref 8.7–10.5)
CHLORIDE SERPL-SCNC: 103 MMOL/L (ref 95–110)
CHLORIDE SERPL-SCNC: 103 MMOL/L (ref 95–110)
CO2 SERPL-SCNC: 23 MMOL/L (ref 23–29)
CO2 SERPL-SCNC: 25 MMOL/L (ref 23–29)
CREAT SERPL-MCNC: 1 MG/DL (ref 0.5–1.4)
CREAT SERPL-MCNC: 1.1 MG/DL (ref 0.5–1.4)
DIFFERENTIAL METHOD: ABNORMAL
EOSINOPHIL # BLD AUTO: 0 K/UL (ref 0–0.5)
EOSINOPHIL NFR BLD: 0.1 % (ref 0–8)
ERYTHROCYTE [DISTWIDTH] IN BLOOD BY AUTOMATED COUNT: 15.4 % (ref 11.5–14.5)
EST. GFR  (NO RACE VARIABLE): >60 ML/MIN/1.73 M^2
EST. GFR  (NO RACE VARIABLE): >60 ML/MIN/1.73 M^2
FERRITIN SERPL-MCNC: 32 NG/ML (ref 20–300)
GLUCOSE SERPL-MCNC: 227 MG/DL (ref 70–110)
GLUCOSE SERPL-MCNC: 55 MG/DL (ref 70–110)
HCT VFR BLD AUTO: 24.9 % (ref 37–48.5)
HGB BLD-MCNC: 8.5 G/DL (ref 12–16)
IMM GRANULOCYTES # BLD AUTO: 0.09 K/UL (ref 0–0.04)
IMM GRANULOCYTES NFR BLD AUTO: 0.5 % (ref 0–0.5)
IRON SERPL-MCNC: 71 UG/DL (ref 30–160)
LACTATE SERPL-SCNC: 0.9 MMOL/L (ref 0.5–2.2)
LYMPHOCYTES # BLD AUTO: 1.5 K/UL (ref 1–4.8)
LYMPHOCYTES NFR BLD: 8.5 % (ref 18–48)
MAGNESIUM SERPL-MCNC: 2.1 MG/DL (ref 1.6–2.6)
MCH RBC QN AUTO: 24.4 PG (ref 27–31)
MCHC RBC AUTO-ENTMCNC: 34.1 G/DL (ref 32–36)
MCV RBC AUTO: 72 FL (ref 82–98)
MONOCYTES # BLD AUTO: 1.2 K/UL (ref 0.3–1)
MONOCYTES NFR BLD: 6.9 % (ref 4–15)
NEUTROPHILS # BLD AUTO: 14.4 K/UL (ref 1.8–7.7)
NEUTROPHILS NFR BLD: 83.8 % (ref 38–73)
NRBC BLD-RTO: 0 /100 WBC
PHOSPHATE SERPL-MCNC: 2.7 MG/DL (ref 2.7–4.5)
PLATELET # BLD AUTO: 297 K/UL (ref 150–450)
PMV BLD AUTO: 10.3 FL (ref 9.2–12.9)
POCT GLUCOSE: 114 MG/DL (ref 70–110)
POCT GLUCOSE: 159 MG/DL (ref 70–110)
POCT GLUCOSE: 189 MG/DL (ref 70–110)
POCT GLUCOSE: 189 MG/DL (ref 70–110)
POCT GLUCOSE: 215 MG/DL (ref 70–110)
POCT GLUCOSE: 51 MG/DL (ref 70–110)
POCT GLUCOSE: 52 MG/DL (ref 70–110)
POCT GLUCOSE: 64 MG/DL (ref 70–110)
POCT GLUCOSE: 86 MG/DL (ref 70–110)
POTASSIUM SERPL-SCNC: 4 MMOL/L (ref 3.5–5.1)
POTASSIUM SERPL-SCNC: 4.4 MMOL/L (ref 3.5–5.1)
RBC # BLD AUTO: 3.48 M/UL (ref 4–5.4)
SATURATED IRON: 17 % (ref 20–50)
SODIUM SERPL-SCNC: 134 MMOL/L (ref 136–145)
SODIUM SERPL-SCNC: 138 MMOL/L (ref 136–145)
TOTAL IRON BINDING CAPACITY: 416 UG/DL (ref 250–450)
TRANSFERRIN SERPL-MCNC: 281 MG/DL (ref 200–375)
TSH SERPL DL<=0.005 MIU/L-ACNC: 0.43 UIU/ML (ref 0.4–4)
VANCOMYCIN SERPL-MCNC: 11.1 UG/ML
WBC # BLD AUTO: 17.17 K/UL (ref 3.9–12.7)

## 2023-12-13 PROCEDURE — 63600175 PHARM REV CODE 636 W HCPCS

## 2023-12-13 PROCEDURE — 84466 ASSAY OF TRANSFERRIN: CPT

## 2023-12-13 PROCEDURE — 83735 ASSAY OF MAGNESIUM: CPT

## 2023-12-13 PROCEDURE — 63600175 PHARM REV CODE 636 W HCPCS: Performed by: INTERNAL MEDICINE

## 2023-12-13 PROCEDURE — 94761 N-INVAS EAR/PLS OXIMETRY MLT: CPT

## 2023-12-13 PROCEDURE — 84100 ASSAY OF PHOSPHORUS: CPT

## 2023-12-13 PROCEDURE — 85025 COMPLETE CBC W/AUTO DIFF WBC: CPT

## 2023-12-13 PROCEDURE — 82728 ASSAY OF FERRITIN: CPT

## 2023-12-13 PROCEDURE — 99291 PR CRITICAL CARE, E/M 30-74 MINUTES: ICD-10-PCS | Mod: ,,, | Performed by: INTERNAL MEDICINE

## 2023-12-13 PROCEDURE — 99291 CRITICAL CARE FIRST HOUR: CPT | Mod: ,,, | Performed by: INTERNAL MEDICINE

## 2023-12-13 PROCEDURE — 20600001 HC STEP DOWN PRIVATE ROOM

## 2023-12-13 PROCEDURE — 63600175 PHARM REV CODE 636 W HCPCS: Performed by: NURSE PRACTITIONER

## 2023-12-13 PROCEDURE — 25000003 PHARM REV CODE 250

## 2023-12-13 PROCEDURE — 80048 BASIC METABOLIC PNL TOTAL CA: CPT | Mod: XB

## 2023-12-13 PROCEDURE — 80053 COMPREHEN METABOLIC PANEL: CPT

## 2023-12-13 PROCEDURE — 80202 ASSAY OF VANCOMYCIN: CPT | Performed by: INTERNAL MEDICINE

## 2023-12-13 PROCEDURE — 84443 ASSAY THYROID STIM HORMONE: CPT

## 2023-12-13 PROCEDURE — 83605 ASSAY OF LACTIC ACID: CPT

## 2023-12-13 PROCEDURE — 25000003 PHARM REV CODE 250: Performed by: NURSE PRACTITIONER

## 2023-12-13 PROCEDURE — 83540 ASSAY OF IRON: CPT

## 2023-12-13 RX ORDER — INSULIN ASPART 100 [IU]/ML
0-10 INJECTION, SOLUTION INTRAVENOUS; SUBCUTANEOUS EVERY 4 HOURS PRN
Status: DISCONTINUED | OUTPATIENT
Start: 2023-12-13 | End: 2023-12-14

## 2023-12-13 RX ORDER — GLUCAGON 1 MG
1 KIT INJECTION
Status: DISCONTINUED | OUTPATIENT
Start: 2023-12-13 | End: 2023-12-14

## 2023-12-13 RX ORDER — MIRTAZAPINE 15 MG/1
15 TABLET, ORALLY DISINTEGRATING ORAL NIGHTLY
Status: DISCONTINUED | OUTPATIENT
Start: 2023-12-13 | End: 2023-12-16 | Stop reason: HOSPADM

## 2023-12-13 RX ORDER — ENOXAPARIN SODIUM 100 MG/ML
40 INJECTION SUBCUTANEOUS EVERY 24 HOURS
Status: DISCONTINUED | OUTPATIENT
Start: 2023-12-13 | End: 2023-12-14

## 2023-12-13 RX ORDER — INSULIN ASPART 100 [IU]/ML
6 INJECTION, SOLUTION INTRAVENOUS; SUBCUTANEOUS
Status: DISCONTINUED | OUTPATIENT
Start: 2023-12-13 | End: 2023-12-13

## 2023-12-13 RX ORDER — CALCIUM CARBONATE 200(500)MG
1000 TABLET,CHEWABLE ORAL 3 TIMES DAILY PRN
Status: DISCONTINUED | OUTPATIENT
Start: 2023-12-13 | End: 2023-12-14

## 2023-12-13 RX ORDER — INSULIN ASPART 100 [IU]/ML
8 INJECTION, SOLUTION INTRAVENOUS; SUBCUTANEOUS
Status: DISCONTINUED | OUTPATIENT
Start: 2023-12-13 | End: 2023-12-14

## 2023-12-13 RX ORDER — DOXYCYCLINE HYCLATE 100 MG
100 TABLET ORAL EVERY 12 HOURS
Status: DISCONTINUED | OUTPATIENT
Start: 2023-12-13 | End: 2023-12-16 | Stop reason: HOSPADM

## 2023-12-13 RX ADMIN — ENOXAPARIN SODIUM 40 MG: 40 INJECTION SUBCUTANEOUS at 06:12

## 2023-12-13 RX ADMIN — HEPARIN SODIUM 5000 UNITS: 5000 INJECTION INTRAVENOUS; SUBCUTANEOUS at 01:12

## 2023-12-13 RX ADMIN — INSULIN DETEMIR 20 UNITS: 100 INJECTION, SOLUTION SUBCUTANEOUS at 08:12

## 2023-12-13 RX ADMIN — DEXTROSE MONOHYDRATE 125 ML: 100 INJECTION, SOLUTION INTRAVENOUS at 06:12

## 2023-12-13 RX ADMIN — INSULIN ASPART 4 UNITS: 100 INJECTION, SOLUTION INTRAVENOUS; SUBCUTANEOUS at 11:12

## 2023-12-13 RX ADMIN — MIRTAZAPINE 15 MG: 15 TABLET, ORALLY DISINTEGRATING ORAL at 09:12

## 2023-12-13 RX ADMIN — INSULIN ASPART 2 UNITS: 100 INJECTION, SOLUTION INTRAVENOUS; SUBCUTANEOUS at 08:12

## 2023-12-13 RX ADMIN — INSULIN DETEMIR 10 UNITS: 100 INJECTION, SOLUTION SUBCUTANEOUS at 12:12

## 2023-12-13 RX ADMIN — ONDANSETRON 4 MG: 2 INJECTION INTRAMUSCULAR; INTRAVENOUS at 01:12

## 2023-12-13 RX ADMIN — MIRTAZAPINE 15 MG: 15 TABLET, ORALLY DISINTEGRATING ORAL at 11:12

## 2023-12-13 RX ADMIN — DOXYCYCLINE HYCLATE 100 MG: 100 TABLET, FILM COATED ORAL at 09:12

## 2023-12-13 RX ADMIN — INSULIN ASPART 6 UNITS: 100 INJECTION, SOLUTION INTRAVENOUS; SUBCUTANEOUS at 11:12

## 2023-12-13 RX ADMIN — DOXYCYCLINE HYCLATE 100 MG: 100 TABLET, FILM COATED ORAL at 11:12

## 2023-12-13 RX ADMIN — CALCIUM CARBONATE (ANTACID) CHEW TAB 500 MG 1000 MG: 500 CHEW TAB at 07:12

## 2023-12-13 RX ADMIN — PIPERACILLIN SODIUM AND TAZOBACTAM SODIUM 4.5 G: 4; .5 INJECTION, POWDER, FOR SOLUTION INTRAVENOUS at 08:12

## 2023-12-13 NOTE — ASSESSMENT & PLAN NOTE
Cr 2.0. likely prerenal ANNA in the setting of dehydration and DKA.     - IVF and resume diet  - trend Cr  - avoid nephrotoxic medication and renally dose

## 2023-12-13 NOTE — PLAN OF CARE
MICU DAILY GOALS     Family/Goals of care/Code Status   Code Status: Full Code    24H Vital Sign Range  Temp:  [98.1 °F (36.7 °C)-98.9 °F (37.2 °C)]   Pulse:  []   Resp:  [12-29]   BP: (113-189)/()   SpO2:  [95 %-100 %]      Shift Events (include procedures and significant events)   No acute events throughout shift. Pt went downstairs for head CT. Received bedside ultrasound of retroperitoneal region.   Insulin gtt discontinued approx 0100. Levemir administered. Accucheks now q4H.   Pt now following commands and A&Ox3 this AM. Tearful mood noted and stated she wants to go home. Diabetic diet ordered. Has had a snack and water, no reports of nausea.   Ambulated once to bathroom to void, standby assistance required.    AWAKE RASS: Goal -    Actual -      Restraint necessity: Not necessary   BREATHE SBT: Not intubated    Coordinate A & B, analgesics/sedatives Pain: managed   SAT: Not intubated   Delirium CAM-ICU: Overall CAM-ICU: Positive   Early(intubated/ Progressive (non-intubated) Mobility MOVE Screen (INTUBATED ONLY): Not intubated    Activity: Activity Management: Ankle pumps - L1, Arm raise - L1   Feeding/Nutrition Diet order: Diet/Nutrition Received: consistent carb/diabetic diet,     Thrombus DVT prophylaxis: VTE Required Core Measure: Pharmacological prophylaxis initiated/maintained   HOB Elevation Head of Bed (HOB) Positioning: HOB at 30-45 degrees   Ulcer Prophylaxis GI: yes   Glucose control managed Glycemic Management: blood glucose monitored   Skin Skin assessed during: Daily Assessment    Sacrum intact/not altered? Yes  Heels intact/not altered? Yes  Surgical wound? No    Check one (no altered skin or altered skin) and sub boxes:  [] No Altered Skin Integrity Present    []Prevention Measures Documented    [] Altered Skin Integrity Present or Discovered   [] LDA present in EPIC              [] LDA added in EPIC   [] Wound Image Taken (required on admit,                   transfer/discharge  and every Tuesday)    Wound Care Consulted? No    Attending Nurse:     Second RN/Staff Member:    Bowel Function no issues    Indwelling Catheter Necessity            De-escalation Antibiotics Yes       VS and assessment per flow sheet, patient progressing towards goals as tolerated, plan of care reviewed with patient, all concerns addressed, will continue to monitor.

## 2023-12-13 NOTE — PLAN OF CARE
CM sent email to [facility] to verify patient's Medicaid [employer] insurance.      Celi Lord RN     [phone]

## 2023-12-13 NOTE — SUBJECTIVE & OBJECTIVE
Interval History/Significant Events: patient mentating better. Complaining of significant abdominal pain. No evidence of bleeding or hematemesis    Review of Systems   Constitutional:  Negative for chills, fatigue and fever.   HENT:  Negative for ear pain and sinus pain.    Eyes:  Negative for photophobia, pain and visual disturbance.   Respiratory:  Negative for cough and shortness of breath.    Cardiovascular:  Negative for chest pain and leg swelling.   Gastrointestinal:  Positive for abdominal pain. Negative for blood in stool, constipation, diarrhea, nausea and vomiting.   Endocrine: Negative for polyuria.   Genitourinary:  Negative for difficulty urinating, dysuria, flank pain, pelvic pain and vaginal pain.   Musculoskeletal:  Negative for back pain and neck pain.   Skin:  Negative for color change and rash.   Neurological:  Negative for dizziness, weakness, light-headedness, numbness and headaches.   Psychiatric/Behavioral:  Negative for confusion and sleep disturbance.      Objective:     Vital Signs (Most Recent):  Temp: 98.6 °F (37 °C) (12/13/23 0701)  Pulse: 108 (12/13/23 1000)  Resp: 15 (12/13/23 1000)  BP: (!) 162/98 (12/13/23 1000)  SpO2: 100 % (12/13/23 1000) Vital Signs (24h Range):  Temp:  [98.1 °F (36.7 °C)-98.8 °F (37.1 °C)] 98.6 °F (37 °C)  Pulse:  [106-117] 108  Resp:  [10-29] 15  SpO2:  [96 %-100 %] 100 %  BP: (138-189)/() 162/98   Weight: 52 kg (114 lb 10.2 oz)  Body mass index is 18.5 kg/m².      Intake/Output Summary (Last 24 hours) at 12/13/2023 1023  Last data filed at 12/13/2023 1000  Gross per 24 hour   Intake 3653.34 ml   Output --   Net 3653.34 ml          Physical Exam  Constitutional:       General: She is not in acute distress.     Appearance: Normal appearance. She is not ill-appearing or diaphoretic.      Comments: disheveled   HENT:      Head: Normocephalic and atraumatic.      Right Ear: External ear normal.      Left Ear: External ear normal.      Nose: Nose normal. No  congestion or rhinorrhea.      Mouth/Throat:      Mouth: Mucous membranes are moist.      Pharynx: Oropharynx is clear. No oropharyngeal exudate or posterior oropharyngeal erythema.   Eyes:      General: No scleral icterus.     Extraocular Movements: Extraocular movements intact.      Conjunctiva/sclera: Conjunctivae normal.   Neck:      Vascular: No carotid bruit.   Cardiovascular:      Rate and Rhythm: Normal rate and regular rhythm.      Pulses: Normal pulses.      Heart sounds: Normal heart sounds. No murmur heard.     No friction rub.   Pulmonary:      Effort: Pulmonary effort is normal. No respiratory distress.      Breath sounds: Normal breath sounds. No stridor. No wheezing.   Chest:      Chest wall: No tenderness.   Abdominal:      General: Abdomen is flat. Bowel sounds are normal. There is no distension.      Palpations: There is no mass.      Tenderness: There is no right CVA tenderness, left CVA tenderness or guarding.   Musculoskeletal:         General: No swelling, tenderness or deformity. Normal range of motion.      Cervical back: Normal range of motion. No rigidity or tenderness.      Right lower leg: No edema.      Left lower leg: No edema.   Skin:     General: Skin is warm and dry.      Capillary Refill: Capillary refill takes less than 2 seconds.      Coloration: Skin is not jaundiced or pale.      Findings: Lesion present. No bruising or erythema.   Neurological:      General: No focal deficit present.      Mental Status: She is alert and oriented to person, place, and time. Mental status is at baseline.      Motor: No weakness.      Coordination: Coordination normal.   Psychiatric:         Mood and Affect: Mood normal.         Behavior: Behavior normal.         Thought Content: Thought content normal.            Vents:     Lines/Drains/Airways       Drain  Duration             Female External Urinary Catheter 12/12/23 1901 <1 day              Peripheral Intravenous Line  Duration                   Peripheral IV - Single Lumen 12/12/23 1152 18 G Right Antecubital <1 day         Peripheral IV - Single Lumen 12/12/23 1901 20 G Left Wrist <1 day                  Significant Labs:    CBC/Anemia Profile:  Recent Labs   Lab 12/12/23  0709 12/12/23  0739 12/12/23  0945 12/13/23  0322   WBC 23.95*  --   --  17.17*   HGB 10.8*  --   --  8.5*   HCT 34.1* 38 34* 24.9*     --   --  297   MCV 77*  --   --  72*   RDW 15.9*  --   --  15.4*   IRON  --   --   --  71   FERRITIN  --   --   --  32        Chemistries:  Recent Labs   Lab 12/12/23  0709 12/12/23  1257 12/12/23  2323 12/13/23  0322 12/13/23  0820   *   < > 136 134* 136   K 6.6*   < > 3.7 4.2 4.2   CL 94*   < > 105 103 100   CO2 5*   < > 25 20* 21*   BUN 55*   < > 45* 37* 30*   CREATININE 2.0*   < > 1.5* 1.3 1.2   CALCIUM 9.7   < > 8.4* 8.3* 8.9   ALBUMIN 3.4*  --   --  2.7*  --    PROT 7.9  --   --  6.1  --    BILITOT 0.2  --   --  0.3  --    ALKPHOS 265*  --   --  182*  --    ALT 53*  --   --  34  --    AST 21  --   --  16  --    MG 2.6  --   --  2.1  --    PHOS 7.1*  --   --  2.7  --     < > = values in this interval not displayed.       All pertinent labs within the past 24 hours have been reviewed.    Significant Imaging:  I have reviewed all pertinent imaging results/findings within the past 24 hours.

## 2023-12-13 NOTE — PLAN OF CARE
[facility] Card[facility] Medical [facility]  Initial Discharge Assessment       Primary Care Provider: No, Primary Doctor    Admission Diagnosis: Lactic acidosis [E87.20]  Hyperglycemia [R73.9]  Diabetic ketoacidosis without coma associated with type 1 diabetes mellitus [E10.10]    Admission Date: 12/12/2023  Expected Discharge Date: 12/17/2023    Transition of Care Barriers: Underinsured, Substance Abuse, Does not adhere to care plan    Payor: /     Extended Emergency Contact Information  Primary Emergency Contact: Maritza Ramos  Mobile Phone: [phone]  Relation: Relative  Preferred language: English   needed? No    Discharge Plan A: Home with family  Discharge Plan B: Other (TBD)    No Pharmacies Listed      Transferred from:     No past medical history on file.      CM met with patient in room for Discharge Planning Assessment.  Patient would not talk to CM or answer any  questions. CM spoke with Maritza Ramos (cousin) [phone] via phone.  Per Maritza, patient lives with her in a 2-story home with 1 step(s) to enter.   Per Maritza, patient was dependent with ADLS and used rollator for ambulation.  Per Maritza, patient is not on dialysis and does not take Coumadin.  Patient will have help from Maritza Ramos (cousin) [phone]  upon discharge.   Discharge Planning discussed with Maritza Ramos (cousin) [phone] via phone.  All questions addressed.  CM will follow for needs.      Discharge Plan A and Plan B have been determined by review of patient's clinical status, future medical and therapeutic needs, and coverage/benefits for post-acute care in coordination with multidisciplinary team members.        Initial Assessment (most recent)       Adult Discharge Assessment - 12/13/23 1501          Discharge Assessment    Assessment Type Discharge Planning Assessment     Confirmed/corrected address, phone number and insurance Yes     Confirmed Demographics Correct on Facesheet     Source of Information family      When was your last doctors appointment? --   11/2023    Communicated [facility] with patient/caregiver Date not available/Unable to determine     Reason For Admission Severe sepsis, Lactic acidosis     People in Home other relative(s)   cousin    Facility Arrived From: Home     Do you expect to return to your current living situation? Yes     Do you have help at home or someone to help you manage your care at home? Yes     Who are your caregiver(s) and their phone number(s)? Maritza Ramos (cousin) [phone]     Prior to hospitilization cognitive status: Unable to Assess   patient lethargic    Current cognitive status: Unable to Assess   patient drowsy and did not want to talk to CM    Walking or Climbing Stairs Difficulty yes     Walking or Climbing Stairs ambulation difficulty, requires equipment     Mobility Management rollator     Dressing/Bathing Difficulty yes     Dressing/Bathing bathing difficulty, assistance 1 person;dressing difficulty, dependent     Dressing/Bathing Management cousin assists in these tasks     Equipment Currently Used at Home shower chair;rollator     Readmission within 30 days? No     Patient currently being followed by outpatient case management? No     Do you currently have service(s) that help you manage your care at home? No     Do you take prescription medications? Yes     Do you have prescription coverage? Yes     Coverage Medicaid - [employer]     Do you have any problems affording any of your prescribed medications? TBD     Is the patient taking medications as prescribed? yes     Who is going to help you get home at discharge? Maritza Ramos (cousin) [phone]     How do you get to doctors appointments? family or friend will provide     Are you on dialysis? No     Do you take coumadin? No     Discharge Plan A Home with family     Discharge Plan B Other   TBD    DME Needed Upon Discharge  other (see comments)   TBD    Discharge Plan discussed with: Caregiver     Name(s) and Number(s)  Maritza Ramos (cousin) [phone]     Transition of Care Barriers Underinsured;Substance Abuse;Does not adhere to care plan        Physical Activity    On average, how many days per week do you engage in moderate to strenuous exercise (like a brisk walk)? 0 days     On average, how many minutes do you engage in exercise at this level? 0 min        Financial Resource Strain    How hard is it for you to pay for the very basics like food, housing, medical care, and heating? Patient refused        Housing Stability    In the last 12 months, was there a time when you were not able to pay the mortgage or rent on time? Patient refused     In the last 12 months, how many places have you lived? 1     In the last 12 months, was there a time when you did not have a steady place to sleep or slept in a shelter (including now)? Patient refused        Transportation Needs    In the past 12 months, has lack of transportation kept you from medical appointments or from getting medications? Patient refused     In the past 12 months, has lack of transportation kept you from meetings, work, or from getting things needed for daily living? Patient refused        Food Insecurity    Within the past 12 months, you worried that your food would run out before you got the money to buy more. Patient refused     Within the past 12 months, the food you bought just didn't last and you didn't have money to get more. Patient refused        Stress    Do you feel stress - tense, restless, nervous, or anxious, or unable to sleep at night because your mind is troubled all the time - these days? Patient refused        Social Connections    In a typical week, how many times do you talk on the phone with family, friends, or neighbors? More than three times a week   per cousin    How often do you get together with friends or relatives? More than three times a week   per cousin    How often do you attend Yarsani or Synagogue services? Never   per cousin    Do  you belong to any clubs or organizations such as Uats[employer] groups, unions, fraternal or athletic groups, or school groups? No   per cousin    How often do you attend meetings of the clubs or organizations you belong to? Never   per cousin    Are you , , , , never , or living with a partner?    per cousin. spouse not in picture       Alcohol Use    Q1: How often do you have a drink containing alcohol? Never   per cousin    Q2: How many drinks containing alcohol do you have on a typical day when you are drinking? Patient does not drink   per cousin    Q3: How often do you have six or more drinks on one occasion? Never   per cousin       OTHER    Name(s) of People in Home Maritza Ramos (cousin) [phone]                            PCP:  No, Primary Doctor  None        Pharmacy:  No Pharmacies Listed      Emergency Contacts:  Extended Emergency Contact Information  Primary Emergency Contact: Maritza Ramos  Mobile Phone: [phone]  Relation: Relative  Preferred language: English   needed? No      Insurance:    Payor: /     Celi Lord RN     [phone]      12/13/2023  3:19 PM

## 2023-12-13 NOTE — PROGRESS NOTES
Therapy with vancomycin was discontinued by the provider.  Pharmacy will sign off, please re-consult as needed.    Thank you for the consult,   Brenda Neville, PharmD, [facility]  [id number]

## 2023-12-13 NOTE — RESIDENT HANDOFF
Handoff     Primary Team: Networked reference to record [facility]  Room Number: 6083/6083 A     Patient Name: Wilfrido Elias MRN: [id number]     Date of Birth: [birth year] Allergies: Patient has no allergy information on record.     Age: 36 y.o. Admit Date: 12/12/2023     Sex: female  BMI: Body mass index is 18.5 kg/m².     Code Status: Full Code        Illness Level (current clinical status): Watcher - No    Reason for Admission: Severe sepsis    Brief HPI (pertinent PMH and diagnosis or differential diagnosis): 36 yoF with meth abuse and diabetes who was admitted to us for acute encephalopathy, DKA, and severe sepsis     Hospital Course (updated, brief assessment by system or problem, significant events): Patient was admitted to ICU for further management. BG came down with insulin gtt and gap closed. Patient was mentating better.     Contingency Plan (special circumstances anticipated and plan): if hypotensive or HD unstable, reconsult ICU    Estimated Discharge Date: per primary team    Discharge Disposition: Home or Self Care    Mentored By: Dr. Leonard

## 2023-12-13 NOTE — CONSULTS
RD consulted regarding diabetic diet education - A1C 13.6.  Per RN, pt not appropriate at this time 2/2 agitation/withdrawing from meth.    Left paperwork w/ RN; will follow-up.    Thanks!  MS Mariluz, RD, LDN

## 2023-12-13 NOTE — HOSPITAL COURSE
Patient was admitted to ICU for further management. BG came down with insulin gtt and gap closed. Patient was mentating better.

## 2023-12-13 NOTE — PROGRESS NOTES
[facility] Card[facility] Medical [facility]  Critical Care Medicine  Progress Note    Patient Name: Wilfrido Elias  MRN: [id number]  Admission Date: 12/12/2023  Hospital Length of Stay: 1 days  Code Status: Full Code  Attending Provider: Noah Leonard MD  Primary Care Provider: No, Primary Doctor   Principal Problem: Severe sepsis    Subjective:     HPI:  This is a 36 year old lady with history of diabetes who presented to the ED for altered mental status. History obtained primarily from chart review. Per EMS pt was 98% on room air with wheezing. Duoneb given enroute with improvement. Per EMS pt CBG came back as greater than 600 and is 913 on admission. Pt is unconscious and non verbal in the ED, she is not answering questions and is unrousable. Per ED note her family was concerned because she seemed a bit out of it. Per chart review she has not been taking her insulin lately.    In the ED, patietn was tachycardia, tachypnea, elevated WBC, and elevated lactic in the setting of DKA. Unclear source of infection as UA and CXR were unremarkable. Patient was admitted to ICU for further management.     Hospital/ICU Course:  Patient was admitted to ICU for further management. BG came down with insulin gtt and gap closed. Patient was mentating better.    Interval History/Significant Events: patient mentating better. Complaining of significant abdominal pain. No evidence of bleeding or hematemesis    Review of Systems   Constitutional:  Negative for chills, fatigue and fever.   HENT:  Negative for ear pain and sinus pain.    Eyes:  Negative for photophobia, pain and visual disturbance.   Respiratory:  Negative for cough and shortness of breath.    Cardiovascular:  Negative for chest pain and leg swelling.   Gastrointestinal:  Positive for abdominal pain. Negative for blood in stool, constipation, diarrhea, nausea and vomiting.   Endocrine: Negative for polyuria.   Genitourinary:  Negative for difficulty urinating, dysuria, flank  pain, pelvic pain and vaginal pain.   Musculoskeletal:  Negative for back pain and neck pain.   Skin:  Negative for color change and rash.   Neurological:  Negative for dizziness, weakness, light-headedness, numbness and headaches.   Psychiatric/Behavioral:  Negative for confusion and sleep disturbance.      Objective:     Vital Signs (Most Recent):  Temp: 98.6 °F (37 °C) (12/13/23 0701)  Pulse: 108 (12/13/23 1000)  Resp: 15 (12/13/23 1000)  BP: (!) 162/98 (12/13/23 1000)  SpO2: 100 % (12/13/23 1000) Vital Signs (24h Range):  Temp:  [98.1 °F (36.7 °C)-98.8 °F (37.1 °C)] 98.6 °F (37 °C)  Pulse:  [106-117] 108  Resp:  [10-29] 15  SpO2:  [96 %-100 %] 100 %  BP: (138-189)/() 162/98   Weight: 52 kg (114 lb 10.2 oz)  Body mass index is 18.5 kg/m².      Intake/Output Summary (Last 24 hours) at 12/13/2023 1023  Last data filed at 12/13/2023 1000  Gross per 24 hour   Intake 3653.34 ml   Output --   Net 3653.34 ml          Physical Exam  Constitutional:       General: She is not in acute distress.     Appearance: Normal appearance. She is not ill-appearing or diaphoretic.      Comments: disheveled   HENT:      Head: Normocephalic and atraumatic.      Right Ear: External ear normal.      Left Ear: External ear normal.      Nose: Nose normal. No congestion or rhinorrhea.      Mouth/Throat:      Mouth: Mucous membranes are moist.      Pharynx: Oropharynx is clear. No oropharyngeal exudate or posterior oropharyngeal erythema.   Eyes:      General: No scleral icterus.     Extraocular Movements: Extraocular movements intact.      Conjunctiva/sclera: Conjunctivae normal.   Neck:      Vascular: No carotid bruit.   Cardiovascular:      Rate and Rhythm: Normal rate and regular rhythm.      Pulses: Normal pulses.      Heart sounds: Normal heart sounds. No murmur heard.     No friction rub.   Pulmonary:      Effort: Pulmonary effort is normal. No respiratory distress.      Breath sounds: Normal breath sounds. No stridor. No  wheezing.   Chest:      Chest wall: No tenderness.   Abdominal:      General: Abdomen is flat. Bowel sounds are normal. There is no distension.      Palpations: There is no mass.      Tenderness: There is no right CVA tenderness, left CVA tenderness or guarding.   Musculoskeletal:         General: No swelling, tenderness or deformity. Normal range of motion.      Cervical back: Normal range of motion. No rigidity or tenderness.      Right lower leg: No edema.      Left lower leg: No edema.   Skin:     General: Skin is warm and dry.      Capillary Refill: Capillary refill takes less than 2 seconds.      Coloration: Skin is not jaundiced or pale.      Findings: Lesion present. No bruising or erythema.   Neurological:      General: No focal deficit present.      Mental Status: She is alert and oriented to person, place, and time. Mental status is at baseline.      Motor: No weakness.      Coordination: Coordination normal.   Psychiatric:         Mood and Affect: Mood normal.         Behavior: Behavior normal.         Thought Content: Thought content normal.            Vents:     Lines/Drains/Airways       Drain  Duration             Female External Urinary Catheter 12/12/23 1901 <1 day              Peripheral Intravenous Line  Duration                  Peripheral IV - Single Lumen 12/12/23 1152 18 G Right Antecubital <1 day         Peripheral IV - Single Lumen 12/12/23 1901 20 G Left Wrist <1 day                  Significant Labs:    CBC/Anemia Profile:  Recent Labs   Lab 12/12/23  0709 12/12/23  0739 12/12/23  0945 12/13/23  0322   WBC 23.95*  --   --  17.17*   HGB 10.8*  --   --  8.5*   HCT 34.1* 38 34* 24.9*     --   --  297   MCV 77*  --   --  72*   RDW 15.9*  --   --  15.4*   IRON  --   --   --  71   FERRITIN  --   --   --  32        Chemistries:  Recent Labs   Lab 12/12/23  0709 12/12/23  1257 12/12/23  2323 12/13/23  0322 12/13/23  0820   *   < > 136 134* 136   K 6.6*   < > 3.7 4.2 4.2   CL 94*   <  > 105 103 100   CO2 5*   < > 25 20* 21*   BUN 55*   < > 45* 37* 30*   CREATININE 2.0*   < > 1.5* 1.3 1.2   CALCIUM 9.7   < > 8.4* 8.3* 8.9   ALBUMIN 3.4*  --   --  2.7*  --    PROT 7.9  --   --  6.1  --    BILITOT 0.2  --   --  0.3  --    ALKPHOS 265*  --   --  182*  --    ALT 53*  --   --  34  --    AST 21  --   --  16  --    MG 2.6  --   --  2.1  --    PHOS 7.1*  --   --  2.7  --     < > = values in this interval not displayed.       All pertinent labs within the past 24 hours have been reviewed.    Significant Imaging:  I have reviewed all pertinent imaging results/findings within the past 24 hours.    ABG  Recent Labs   Lab 12/12/23  0717   PH 7.119*   PO2 42   PCO2 21.1*   HCO3 6.8*   BE -23*     Assessment/Plan:     Neuro  Acute metabolic encephalopathy  Likely 2/2 to sepsis or DKA.     - continue monitoring  - obtain UDS and TSH    Renal/  Hyperphosphatemia  Phos 7.1 on admission. Likely elevated in the setting of metabolic acidosis and renal dysfunction    - trend    Lactic acidosis  See Sepsis    High anion gap metabolic acidosis  Likely 2/2 DKA vs lactic acidosis. See DKA    ANNA (acute kidney injury)  Cr 2.0. likely prerenal ANNA in the setting of dehydration and DKA.     - IVF and resume diet  - trend Cr  - avoid nephrotoxic medication and renally dose    Hyperkalemia  K of 6.6 on admission. EKG showed sinus tach and no peaked t waves noted.     resolved    - BMP q4  - calcium gluconate for cardioprotection    ID  * Severe sepsis  36 yoF with unknown past medical history presented with tachycardia, tachypnea, elevated WBC, and elevated lactic in the setting of DKA. Unclear source of infection as UA and CXR were unremarkable. History was minimal as patient is encephalopathic. Likely source is multiple skin infections noted on patient.     - d/c vanc and zosyn and start doxycycline  - f/u Bcx and tailor antibiotics  - trend lactic    Oncology  Microcytic anemia  Hgb of 10 and MCV of 77. No evidence of  bleeding    - trend Hgb and transfuse <7    Endocrine  DKA (diabetic ketoacidosis)  BG >500 on multiple POCT glucose. BHB elevated. A1c of 13.6. repeat labs showed closed gap and acidosis mostly resolved.     - patient transitioned to basal bolus insulin  - BMP q4  - replete K as needed  - nutrition consulted       Critical Care Daily Checklist:    A: Awake: RASS Goal/Actual Goal: RASS Goal: 0-->alert and calm  Actual:     B: Spontaneous Breathing Trial Performed?     C: SAT & SBT Coordinated?  NA                      D: Delirium: CAM-ICU Overall CAM-ICU: Positive   E: Early Mobility Performed? Yes   F: Feeding Goal:    Status:     Current Diet Order   Procedures    Diet diabetic 1500 Calorie; Standard Tray     Order Specific Question:   Total calories:     Answer:   1500 Calorie     Order Specific Question:   Tray type:     Answer:   Standard Tray      AS: Analgesia/Sedation none   T: Thromboembolic Prophylaxis heparin   H: HOB > 300 Yes   U: Stress Ulcer Prophylaxis (if needed) none   G: Glucose Control Started basal bolus   B: Bowel Function     I: Indwelling Catheter (Lines & Locke) Necessity locke   D: De-escalation of Antimicrobials/Pharmacotherapies doxy    Plan for the day/ETD ongoing    Code Status:  Family/Goals of Care: Full Code  ongoing       Critical secondary to Patient has a condition that poses threat to life and bodily function: Acute Renal Failure      Critical care was time spent personally by me on the following activities: development of treatment plan with patient or surrogate and bedside caregivers, discussions with consultants, evaluation of patient's response to treatment, examination of patient, ordering and performing treatments and interventions, ordering and review of laboratory studies, ordering and review of radiographic studies, pulse oximetry, re-evaluation of patient's condition. This critical care time did not overlap with that of any other provider or involve time for any  procedures.     Mónica Stringer MD  Critical Care Medicine  [facility] - Cardiac Medical ICU

## 2023-12-14 PROBLEM — E83.39 HYPERPHOSPHATEMIA: Status: RESOLVED | Noted: 2023-12-12 | Resolved: 2023-12-14

## 2023-12-14 PROBLEM — E11.65 DIABETES MELLITUS WITH HYPERGLYCEMIA: Status: ACTIVE | Noted: 2023-12-14

## 2023-12-14 PROBLEM — E87.5 HYPERKALEMIA: Status: RESOLVED | Noted: 2023-12-12 | Resolved: 2023-12-14

## 2023-12-14 LAB
ALBUMIN SERPL BCP-MCNC: 2.7 G/DL (ref 3.5–5.2)
ALBUMIN SERPL BCP-MCNC: 2.7 G/DL (ref 3.5–5.2)
ALBUMIN SERPL BCP-MCNC: 3.4 G/DL (ref 3.5–5.2)
ALP SERPL-CCNC: 178 U/L (ref 55–135)
ALP SERPL-CCNC: 182 U/L (ref 55–135)
ALP SERPL-CCNC: 265 U/L (ref 55–135)
ALT SERPL W/O P-5'-P-CCNC: 31 U/L (ref 10–44)
ALT SERPL W/O P-5'-P-CCNC: 34 U/L (ref 10–44)
ALT SERPL W/O P-5'-P-CCNC: 53 U/L (ref 10–44)
ANION GAP SERPL CALC-SCNC: 11 MMOL/L (ref 8–16)
ANION GAP SERPL CALC-SCNC: 12 MMOL/L (ref 8–16)
ANION GAP SERPL CALC-SCNC: 15 MMOL/L (ref 8–16)
ANION GAP SERPL CALC-SCNC: 17 MMOL/L (ref 8–16)
ANION GAP SERPL CALC-SCNC: 18 MMOL/L (ref 8–16)
ANION GAP SERPL CALC-SCNC: 26 MMOL/L (ref 8–16)
ANION GAP SERPL CALC-SCNC: 33 MMOL/L (ref 8–16)
ANION GAP SERPL CALC-SCNC: 6 MMOL/L (ref 8–16)
ANION GAP SERPL CALC-SCNC: 9 MMOL/L (ref 8–16)
AST SERPL-CCNC: 16 U/L (ref 10–40)
AST SERPL-CCNC: 21 U/L (ref 10–40)
AST SERPL-CCNC: 34 U/L (ref 10–40)
BASOPHILS # BLD AUTO: 0.03 K/UL (ref 0–0.2)
BASOPHILS NFR BLD: 0.4 % (ref 0–1.9)
BILIRUB SERPL-MCNC: 0.2 MG/DL (ref 0.1–1)
BILIRUB SERPL-MCNC: 0.3 MG/DL (ref 0.1–1)
BILIRUB SERPL-MCNC: 0.3 MG/DL (ref 0.1–1)
BUN SERPL-MCNC: 13 MG/DL (ref 6–20)
BUN SERPL-MCNC: 30 MG/DL (ref 6–20)
BUN SERPL-MCNC: 37 MG/DL (ref 6–20)
BUN SERPL-MCNC: 45 MG/DL (ref 6–20)
BUN SERPL-MCNC: 50 MG/DL (ref 6–20)
BUN SERPL-MCNC: 51 MG/DL (ref 6–20)
BUN SERPL-MCNC: 53 MG/DL (ref 6–20)
BUN SERPL-MCNC: 55 MG/DL (ref 6–20)
BUN SERPL-MCNC: 58 MG/DL (ref 6–20)
CALCIUM SERPL-MCNC: 8.1 MG/DL (ref 8.7–10.5)
CALCIUM SERPL-MCNC: 8.3 MG/DL (ref 8.7–10.5)
CALCIUM SERPL-MCNC: 8.4 MG/DL (ref 8.7–10.5)
CALCIUM SERPL-MCNC: 8.5 MG/DL (ref 8.7–10.5)
CALCIUM SERPL-MCNC: 8.5 MG/DL (ref 8.7–10.5)
CALCIUM SERPL-MCNC: 8.7 MG/DL (ref 8.7–10.5)
CALCIUM SERPL-MCNC: 8.9 MG/DL (ref 8.7–10.5)
CALCIUM SERPL-MCNC: 9 MG/DL (ref 8.7–10.5)
CALCIUM SERPL-MCNC: 9.7 MG/DL (ref 8.7–10.5)
CHLORIDE SERPL-SCNC: 100 MMOL/L (ref 95–110)
CHLORIDE SERPL-SCNC: 100 MMOL/L (ref 95–110)
CHLORIDE SERPL-SCNC: 101 MMOL/L (ref 95–110)
CHLORIDE SERPL-SCNC: 101 MMOL/L (ref 95–110)
CHLORIDE SERPL-SCNC: 103 MMOL/L (ref 95–110)
CHLORIDE SERPL-SCNC: 103 MMOL/L (ref 95–110)
CHLORIDE SERPL-SCNC: 105 MMOL/L (ref 95–110)
CHLORIDE SERPL-SCNC: 94 MMOL/L (ref 95–110)
CHLORIDE SERPL-SCNC: 96 MMOL/L (ref 95–110)
CO2 SERPL-SCNC: 12 MMOL/L (ref 23–29)
CO2 SERPL-SCNC: 13 MMOL/L (ref 23–29)
CO2 SERPL-SCNC: 20 MMOL/L (ref 23–29)
CO2 SERPL-SCNC: 20 MMOL/L (ref 23–29)
CO2 SERPL-SCNC: 21 MMOL/L (ref 23–29)
CO2 SERPL-SCNC: 25 MMOL/L (ref 23–29)
CO2 SERPL-SCNC: 28 MMOL/L (ref 23–29)
CO2 SERPL-SCNC: 5 MMOL/L (ref 23–29)
CO2 SERPL-SCNC: 8 MMOL/L (ref 23–29)
CREAT SERPL-MCNC: 0.7 MG/DL (ref 0.5–1.4)
CREAT SERPL-MCNC: 1.2 MG/DL (ref 0.5–1.4)
CREAT SERPL-MCNC: 1.3 MG/DL (ref 0.5–1.4)
CREAT SERPL-MCNC: 1.5 MG/DL (ref 0.5–1.4)
CREAT SERPL-MCNC: 1.7 MG/DL (ref 0.5–1.4)
CREAT SERPL-MCNC: 1.8 MG/DL (ref 0.5–1.4)
CREAT SERPL-MCNC: 1.9 MG/DL (ref 0.5–1.4)
CREAT SERPL-MCNC: 2 MG/DL (ref 0.5–1.4)
CREAT SERPL-MCNC: 2 MG/DL (ref 0.5–1.4)
DIFFERENTIAL METHOD: ABNORMAL
EOSINOPHIL # BLD AUTO: 0 K/UL (ref 0–0.5)
EOSINOPHIL NFR BLD: 0.2 % (ref 0–8)
ERYTHROCYTE [DISTWIDTH] IN BLOOD BY AUTOMATED COUNT: 15.7 % (ref 11.5–14.5)
EST. GFR  (NO RACE VARIABLE): 32 ML/MIN/1.73 M^2
EST. GFR  (NO RACE VARIABLE): 32 ML/MIN/1.73 M^2
EST. GFR  (NO RACE VARIABLE): 34 ML/MIN/1.73 M^2
EST. GFR  (NO RACE VARIABLE): 36.3 ML/MIN/1.73 M^2
EST. GFR  (NO RACE VARIABLE): 38.9 ML/MIN/1.73 M^2
EST. GFR  (NO RACE VARIABLE): 45.2 ML/MIN/1.73 M^2
EST. GFR  (NO RACE VARIABLE): 53.6 ML/MIN/1.73 M^2
EST. GFR  (NO RACE VARIABLE): 59.1 ML/MIN/1.73 M^2
EST. GFR  (NO RACE VARIABLE): >60 ML/MIN/1.73 M^2
GLUCOSE SERPL-MCNC: 101 MG/DL (ref 70–110)
GLUCOSE SERPL-MCNC: 137 MG/DL (ref 70–110)
GLUCOSE SERPL-MCNC: 149 MG/DL (ref 70–110)
GLUCOSE SERPL-MCNC: 178 MG/DL (ref 70–110)
GLUCOSE SERPL-MCNC: 194 MG/DL (ref 70–110)
GLUCOSE SERPL-MCNC: 207 MG/DL (ref 70–110)
GLUCOSE SERPL-MCNC: 364 MG/DL (ref 70–110)
GLUCOSE SERPL-MCNC: 370 MG/DL (ref 70–110)
GLUCOSE SERPL-MCNC: 799 MG/DL (ref 70–110)
GLUCOSE SERPL-MCNC: 914 MG/DL (ref 70–110)
HCT VFR BLD AUTO: 27.5 % (ref 37–48.5)
HGB BLD-MCNC: 9.6 G/DL (ref 12–16)
IMM GRANULOCYTES # BLD AUTO: 0.01 K/UL (ref 0–0.04)
IMM GRANULOCYTES NFR BLD AUTO: 0.1 % (ref 0–0.5)
LIPASE SERPL-CCNC: 12 U/L (ref 4–60)
LYMPHOCYTES # BLD AUTO: 1.5 K/UL (ref 1–4.8)
LYMPHOCYTES NFR BLD: 17.5 % (ref 18–48)
MAGNESIUM SERPL-MCNC: 2.2 MG/DL (ref 1.6–2.6)
MCH RBC QN AUTO: 24.3 PG (ref 27–31)
MCHC RBC AUTO-ENTMCNC: 34.9 G/DL (ref 32–36)
MCV RBC AUTO: 70 FL (ref 82–98)
MONOCYTES # BLD AUTO: 0.6 K/UL (ref 0.3–1)
MONOCYTES NFR BLD: 7.1 % (ref 4–15)
NEUTROPHILS # BLD AUTO: 6.2 K/UL (ref 1.8–7.7)
NEUTROPHILS NFR BLD: 74.7 % (ref 38–73)
NRBC BLD-RTO: 0 /100 WBC
PHOSPHATE SERPL-MCNC: 2.4 MG/DL (ref 2.7–4.5)
PLATELET # BLD AUTO: 260 K/UL (ref 150–450)
PMV BLD AUTO: 9.7 FL (ref 9.2–12.9)
POCT GLUCOSE: 101 MG/DL (ref 70–110)
POCT GLUCOSE: 110 MG/DL (ref 70–110)
POCT GLUCOSE: 117 MG/DL (ref 70–110)
POCT GLUCOSE: 237 MG/DL (ref 70–110)
POCT GLUCOSE: 370 MG/DL (ref 70–110)
POCT GLUCOSE: 62 MG/DL (ref 70–110)
POTASSIUM SERPL-SCNC: 3.7 MMOL/L (ref 3.5–5.1)
POTASSIUM SERPL-SCNC: 3.9 MMOL/L (ref 3.5–5.1)
POTASSIUM SERPL-SCNC: 4.2 MMOL/L (ref 3.5–5.1)
POTASSIUM SERPL-SCNC: 4.2 MMOL/L (ref 3.5–5.1)
POTASSIUM SERPL-SCNC: 4.4 MMOL/L (ref 3.5–5.1)
POTASSIUM SERPL-SCNC: 4.4 MMOL/L (ref 3.5–5.1)
POTASSIUM SERPL-SCNC: 4.5 MMOL/L (ref 3.5–5.1)
POTASSIUM SERPL-SCNC: 4.9 MMOL/L (ref 3.5–5.1)
POTASSIUM SERPL-SCNC: 6.6 MMOL/L (ref 3.5–5.1)
PROT SERPL-MCNC: 6.1 G/DL (ref 6–8.4)
PROT SERPL-MCNC: 6.4 G/DL (ref 6–8.4)
PROT SERPL-MCNC: 7.9 G/DL (ref 6–8.4)
RBC # BLD AUTO: 3.95 M/UL (ref 4–5.4)
SODIUM SERPL-SCNC: 130 MMOL/L (ref 136–145)
SODIUM SERPL-SCNC: 131 MMOL/L (ref 136–145)
SODIUM SERPL-SCNC: 131 MMOL/L (ref 136–145)
SODIUM SERPL-SCNC: 132 MMOL/L (ref 136–145)
SODIUM SERPL-SCNC: 134 MMOL/L (ref 136–145)
SODIUM SERPL-SCNC: 135 MMOL/L (ref 136–145)
SODIUM SERPL-SCNC: 136 MMOL/L (ref 136–145)
SODIUM SERPL-SCNC: 136 MMOL/L (ref 136–145)
SODIUM SERPL-SCNC: 137 MMOL/L (ref 136–145)
TROPONIN I SERPL DL<=0.01 NG/ML-MCNC: 0.01 NG/ML (ref 0–0.03)
WBC # BLD AUTO: 8.27 K/UL (ref 3.9–12.7)

## 2023-12-14 PROCEDURE — 25000003 PHARM REV CODE 250: Performed by: HOSPITALIST

## 2023-12-14 PROCEDURE — 99222 1ST HOSP IP/OBS MODERATE 55: CPT | Mod: ,,, | Performed by: PHYSICIAN ASSISTANT

## 2023-12-14 PROCEDURE — 63600175 PHARM REV CODE 636 W HCPCS: Performed by: PHYSICIAN ASSISTANT

## 2023-12-14 PROCEDURE — 83690 ASSAY OF LIPASE: CPT

## 2023-12-14 PROCEDURE — 20600001 HC STEP DOWN PRIVATE ROOM

## 2023-12-14 PROCEDURE — 36415 COLL VENOUS BLD VENIPUNCTURE: CPT

## 2023-12-14 PROCEDURE — 84100 ASSAY OF PHOSPHORUS: CPT

## 2023-12-14 PROCEDURE — 36415 COLL VENOUS BLD VENIPUNCTURE: CPT | Performed by: HOSPITALIST

## 2023-12-14 PROCEDURE — 25000003 PHARM REV CODE 250

## 2023-12-14 PROCEDURE — 80053 COMPREHEN METABOLIC PANEL: CPT

## 2023-12-14 PROCEDURE — 25000003 PHARM REV CODE 250: Performed by: EMERGENCY MEDICINE

## 2023-12-14 PROCEDURE — 99222 PR INITIAL HOSPITAL CARE,LEVL II: ICD-10-PCS | Mod: ,,, | Performed by: PHYSICIAN ASSISTANT

## 2023-12-14 PROCEDURE — 84484 ASSAY OF TROPONIN QUANT: CPT | Performed by: HOSPITALIST

## 2023-12-14 PROCEDURE — 63600175 PHARM REV CODE 636 W HCPCS: Performed by: HOSPITALIST

## 2023-12-14 PROCEDURE — 93005 ELECTROCARDIOGRAM TRACING: CPT

## 2023-12-14 PROCEDURE — 85025 COMPLETE CBC W/AUTO DIFF WBC: CPT

## 2023-12-14 PROCEDURE — 83735 ASSAY OF MAGNESIUM: CPT

## 2023-12-14 PROCEDURE — 25000003 PHARM REV CODE 250: Performed by: PHYSICIAN ASSISTANT

## 2023-12-14 RX ORDER — ACETAMINOPHEN 500 MG
1000 TABLET ORAL EVERY 8 HOURS
Status: DISCONTINUED | OUTPATIENT
Start: 2023-12-14 | End: 2023-12-16 | Stop reason: HOSPADM

## 2023-12-14 RX ORDER — AMOXICILLIN 250 MG
1 CAPSULE ORAL DAILY PRN
Status: DISCONTINUED | OUTPATIENT
Start: 2023-12-14 | End: 2023-12-16 | Stop reason: HOSPADM

## 2023-12-14 RX ORDER — ENOXAPARIN SODIUM 100 MG/ML
30 INJECTION SUBCUTANEOUS EVERY 24 HOURS
Status: DISCONTINUED | OUTPATIENT
Start: 2023-12-14 | End: 2023-12-16 | Stop reason: HOSPADM

## 2023-12-14 RX ORDER — MAG HYDROX/ALUMINUM HYD/SIMETH 200-200-20
30 SUSPENSION, ORAL (FINAL DOSE FORM) ORAL
Status: DISCONTINUED | OUTPATIENT
Start: 2023-12-14 | End: 2023-12-14

## 2023-12-14 RX ORDER — MAG HYDROX/ALUMINUM HYD/SIMETH 200-200-20
30 SUSPENSION, ORAL (FINAL DOSE FORM) ORAL
Status: DISCONTINUED | OUTPATIENT
Start: 2023-12-14 | End: 2023-12-16 | Stop reason: HOSPADM

## 2023-12-14 RX ORDER — IBUPROFEN 200 MG
16 TABLET ORAL
Status: DISCONTINUED | OUTPATIENT
Start: 2023-12-14 | End: 2023-12-16 | Stop reason: HOSPADM

## 2023-12-14 RX ORDER — IBUPROFEN 200 MG
24 TABLET ORAL
Status: DISCONTINUED | OUTPATIENT
Start: 2023-12-14 | End: 2023-12-16 | Stop reason: HOSPADM

## 2023-12-14 RX ORDER — OXYCODONE HYDROCHLORIDE 5 MG/1
5 TABLET ORAL EVERY 6 HOURS PRN
Status: DISCONTINUED | OUTPATIENT
Start: 2023-12-14 | End: 2023-12-16 | Stop reason: HOSPADM

## 2023-12-14 RX ORDER — GLUCAGON 1 MG
1 KIT INJECTION
Status: DISCONTINUED | OUTPATIENT
Start: 2023-12-14 | End: 2023-12-16 | Stop reason: HOSPADM

## 2023-12-14 RX ORDER — PANTOPRAZOLE SODIUM 40 MG/1
40 TABLET, DELAYED RELEASE ORAL DAILY
Status: DISCONTINUED | OUTPATIENT
Start: 2023-12-14 | End: 2023-12-16 | Stop reason: HOSPADM

## 2023-12-14 RX ORDER — INSULIN ASPART 100 [IU]/ML
0-10 INJECTION, SOLUTION INTRAVENOUS; SUBCUTANEOUS
Status: DISCONTINUED | OUTPATIENT
Start: 2023-12-14 | End: 2023-12-16 | Stop reason: HOSPADM

## 2023-12-14 RX ORDER — INSULIN ASPART 100 [IU]/ML
4 INJECTION, SOLUTION INTRAVENOUS; SUBCUTANEOUS
Status: DISCONTINUED | OUTPATIENT
Start: 2023-12-14 | End: 2023-12-16 | Stop reason: HOSPADM

## 2023-12-14 RX ORDER — SODIUM,POTASSIUM PHOSPHATES 280-250MG
1 POWDER IN PACKET (EA) ORAL
Status: COMPLETED | OUTPATIENT
Start: 2023-12-14 | End: 2023-12-14

## 2023-12-14 RX ADMIN — OXYCODONE HYDROCHLORIDE 5 MG: 5 TABLET ORAL at 05:12

## 2023-12-14 RX ADMIN — DOXYCYCLINE HYCLATE 100 MG: 100 TABLET, FILM COATED ORAL at 08:12

## 2023-12-14 RX ADMIN — INSULIN ASPART 4 UNITS: 100 INJECTION, SOLUTION INTRAVENOUS; SUBCUTANEOUS at 12:12

## 2023-12-14 RX ADMIN — ALUMINUM HYDROXIDE, MAGNESIUM HYDROXIDE, AND SIMETHICONE 30 ML: 200; 200; 20 SUSPENSION ORAL at 05:12

## 2023-12-14 RX ADMIN — INSULIN ASPART 4 UNITS: 100 INJECTION, SOLUTION INTRAVENOUS; SUBCUTANEOUS at 05:12

## 2023-12-14 RX ADMIN — POTASSIUM & SODIUM PHOSPHATES POWDER PACK 280-160-250 MG 1 PACKET: 280-160-250 PACK at 08:12

## 2023-12-14 RX ADMIN — INSULIN ASPART 8 UNITS: 100 INJECTION, SOLUTION INTRAVENOUS; SUBCUTANEOUS at 12:12

## 2023-12-14 RX ADMIN — PANTOPRAZOLE SODIUM 40 MG: 40 TABLET, DELAYED RELEASE ORAL at 11:12

## 2023-12-14 RX ADMIN — DEXTROSE MONOHYDRATE 125 ML: 100 INJECTION, SOLUTION INTRAVENOUS at 12:12

## 2023-12-14 RX ADMIN — OXYCODONE HYDROCHLORIDE 5 MG: 5 TABLET ORAL at 11:12

## 2023-12-14 RX ADMIN — ALUMINUM HYDROXIDE, MAGNESIUM HYDROXIDE, AND SIMETHICONE 30 ML: 200; 200; 20 SUSPENSION ORAL at 08:12

## 2023-12-14 RX ADMIN — POTASSIUM & SODIUM PHOSPHATES POWDER PACK 280-160-250 MG 1 PACKET: 280-160-250 PACK at 11:12

## 2023-12-14 RX ADMIN — ALUMINUM HYDROXIDE, MAGNESIUM HYDROXIDE, AND SIMETHICONE 30 ML: 200; 200; 20 SUSPENSION ORAL at 09:12

## 2023-12-14 RX ADMIN — INSULIN DETEMIR 10 UNITS: 100 INJECTION, SOLUTION SUBCUTANEOUS at 12:12

## 2023-12-14 RX ADMIN — ACETAMINOPHEN 1000 MG: 500 TABLET ORAL at 03:12

## 2023-12-14 RX ADMIN — ACETAMINOPHEN 1000 MG: 500 TABLET ORAL at 08:12

## 2023-12-14 RX ADMIN — MIRTAZAPINE 15 MG: 15 TABLET, ORALLY DISINTEGRATING ORAL at 08:12

## 2023-12-14 RX ADMIN — CALCIUM CARBONATE (ANTACID) CHEW TAB 500 MG 1000 MG: 500 CHEW TAB at 03:12

## 2023-12-14 RX ADMIN — POTASSIUM & SODIUM PHOSPHATES POWDER PACK 280-160-250 MG 1 PACKET: 280-160-250 PACK at 05:12

## 2023-12-14 RX ADMIN — ACETAMINOPHEN 1000 MG: 500 TABLET ORAL at 09:12

## 2023-12-14 RX ADMIN — ENOXAPARIN SODIUM 30 MG: 30 INJECTION SUBCUTANEOUS at 05:12

## 2023-12-14 NOTE — ASSESSMENT & PLAN NOTE
BG goal: 140-180  Unclear T1 vs T2.  Phenotypically more consistent with T1 and comes in with DKA. Pt not cooperative during interview as she reports she has told everyone, everything already. On insulin tandem pump at home, she reports taking it off but unclear when.  Comes in with DKA which has since resolved.   Low blood sugars with transition from insulin drip.  Will WBD patient at 0.4 and monitor.    - Adjust to Levemir 10 units daily,  - Start Novolog 4 units AC,   - Continue Novolog Moderate dose correction with ISF 25 starting at 150    - POCT Glucose before meals, at bedtime and at 2 am  - Hypoglycemia protocol in place  - Requested that pt bring her Tandem pump in with a Dexcom G6.      ** Please notify Endocrine for any change and/or advance in diet**  ** Please call Endocrine for any BG related issues **     Discharge Planning:   TBD. Please notify endocrinology prior to discharge.

## 2023-12-14 NOTE — PLAN OF CARE
Pt in bed no acute distress noted at this time. No c/o voiced. Call light in reach and Safety measures in progress.    Problem: Adult Inpatient Plan of Care  Goal: Plan of Care Review  Outcome: Ongoing, Progressing  Goal: Absence of Hospital-Acquired Illness or Injury  Outcome: Ongoing, Progressing  Goal: Optimal Comfort and Wellbeing  Outcome: Ongoing, Progressing

## 2023-12-14 NOTE — ASSESSMENT & PLAN NOTE
BG >500 on multiple POCT glucose. BHB elevated. A1c of 13.6. repeat labs showed closed gap and acidosis mostly resolved.      - patient transitioned to basal bolus insulin  - BMP q4  - replete K as needed  - nutrition consulted    Patient's FSGs are controlled on current medication regimen.  Last A1c reviewed-   Lab Results   Component Value Date    HGBA1C 13.6 (H) 12/12/2023     Most recent fingerstick glucose reviewed-   Recent Labs   Lab 12/13/23  2330 12/14/23  0023 12/14/23  0115 12/14/23  0330   POCTGLUCOSE 64* 62* 117* 101       Antihyperglycemics (From admission, onward)      Start     Stop Route Frequency Ordered    12/14/23 1130  insulin aspart U-100 pen 4 Units         -- SubQ 3 times daily with meals 12/14/23 0919    12/14/23 1030  insulin detemir U-100 (Levemir) pen 10 Units         -- SubQ Daily 12/14/23 0919    12/14/23 1017  insulin aspart U-100 pen 0-10 Units         -- SubQ Before meals & nightly PRN 12/14/23 0919    12/12/23 1245  insulin regular in 0.9 % NaCl 100 unit/100 mL (1 unit/mL) infusion        Question Answer Comment   Initial dose (DO NOT CHANGE): 0.1 units/kg/hr    Insulin Rate Adjustment (DO NOT MODIFY ANSWER) [email or web address]\Images\Pharmacy\InsulinInfusions\INSULIN ADJUSTMENT DKA version [id number]        12/13/23 0130 IV Continuous 12/12/23 1235          12/14 Endocrine consulted

## 2023-12-14 NOTE — NURSING
Nurses Note -- 4 Eyes      12/14/2023   4:51 AM      Skin assessed during: Transfer      [x] No Altered Skin Integrity Present    []Prevention Measures Documented      [] Yes- Altered Skin Integrity Present or Discovered   [] LDA Added if Not in Epic (Describe Wound)   [] New Altered Skin Integrity was Present on Admit and Documented in LDA   [] Wound Image Taken    Wound Care Consulted? No    Attending Nurse:  Zee Duffy RN/Staff Member:  DONOVAN Barnett

## 2023-12-14 NOTE — ASSESSMENT & PLAN NOTE
36 yoF with unknown past medical history presented with tachycardia, tachypnea, elevated WBC, and elevated lactic in the setting of DKA. Unclear source of infection as UA and CXR were unremarkable. History was minimal as patient is encephalopathic. Likely source is multiple skin infections noted on patient.      - d/c vanc and zosyn and start doxycycline  - f/u Bcx and tailor antibiotics    12/14 BCX 2 NGTD. on doxycycline

## 2023-12-14 NOTE — HPI
Reason for Consult: Management of T1DM vd T2DM, Hyperglycemia       Diabetes diagnosis year: > 5 years ago    Home Diabetes Medications:  Tandem T slim    How often checking glucose at home? >4 x day Dexcom  BG readings on regimen: 100s-300s  Hypoglycemia on the regimen?  No  Missed doses on regimen?  Yes reports forgot to put it back on.    Diabetes Complications include:     Hyperglycemia    Complicating diabetes co morbidities:   Denies      HPI:   Patient is a 36 y.o. female with diabetes who presented to the ED for altered mental status.  In the ED, patient was tachycardia, tachypnea, elevated WBC, and elevated lactic in the setting of DKA. Unclear source of infection as UA and CXR were unremarkable. Patient was admitted to ICU for further management. DKA resolved on insulin drip.  Endo consulted for bg management.  Pt reports that she typically wears tandem pump but forgot to put it back on.  She could not remember how long it had been off.

## 2023-12-14 NOTE — CONSULTS
[facility] - Tele[facility] Stepdown  Endocrinology  Diabetes Consult Note    Consult Requested by: Chris Mcdaniel MD   Reason for admit: Severe sepsis    HISTORY OF PRESENT ILLNESS:  Reason for Consult: Management of T1DM vd T2DM, Hyperglycemia       Diabetes diagnosis year: > 5 years ago    Home Diabetes Medications:  Tandem T slim    How often checking glucose at home? >4 x day Dexcom  BG readings on regimen: 100s-300s  Hypoglycemia on the regimen?  No  Missed doses on regimen?  Yes reports forgot to put it back on.    Diabetes Complications include:     Hyperglycemia    Complicating diabetes co morbidities:   Denies      HPI:   Patient is a 36 y.o. female with diabetes who presented to the ED for altered mental status.  In the ED, patient was tachycardia, tachypnea, elevated WBC, and elevated lactic in the setting of DKA. Unclear source of infection as UA and CXR were unremarkable. Patient was admitted to ICU for further management. DKA resolved on insulin drip.  Endo consulted for bg management.  Pt reports that she typically wears tandem pump but forgot to put it back on.  She could not remember how long it had been off.          Interval HPI:   No acute events overnight. Patient in room 8084/8084 A. Blood glucose variable. BG at and below goal on current insulin regimen (SSI, prandial, and basal insulin ). Steroid use- None .      Renal function- Normal   Vasopressors-  None     Diet diabetic 1500 Calorie; Standard Tray     Eatin-50%  Nausea: No  Hypoglycemia and intervention: No  Fever: No  TPN and/or TF: No    PMH, PSH, FH, SH updated and reviewed     ROS:    Review of Systems   Reason unable to perform ROS: Refusal.   Respiratory:  Negative for cough and shortness of breath.    Cardiovascular:  Negative for chest pain.   Gastrointestinal:  Negative for vomiting.       Current Medications and/or Treatments Impacting Glycemic Control  Immunotherapy:    Immunosuppressants       None          Steroids:    Hormones (From admission, onward)      None          Pressors:    Autonomic Drugs (From admission, onward)      None          Hyperglycemia/Diabetes Medications:   Antihyperglycemics (From admission, onward)      Start     Stop Route Frequency Ordered    12/14/23 1130  insulin aspart U-100 pen 4 Units         -- SubQ 3 times daily with meals 12/14/23 0919    12/14/23 1030  insulin detemir U-100 (Levemir) pen 10 Units         -- SubQ Daily 12/14/23 0919    12/14/23 1017  insulin aspart U-100 pen 0-10 Units         -- SubQ Before meals & nightly PRN 12/14/23 0919    12/12/23 1245  insulin regular in 0.9 % NaCl 100 unit/100 mL (1 unit/mL) infusion        Question Answer Comment   Initial dose (DO NOT CHANGE): 0.1 units/kg/hr    Insulin Rate Adjustment (DO NOT MODIFY ANSWER) \\[email or web address]\Images\Pharmacy\InsulinInfusions\INSULIN ADJUSTMENT DKA version [id number].pdf        12/13/23 0130 IV Continuous 12/12/23 1235             PHYSICAL EXAMINATION:  Vitals:    12/14/23 1155   BP: (!) 152/90   Pulse: 99   Resp: 15   Temp: 98.7 °F (37.1 °C)     Body mass index is 18.5 kg/m².     Physical Exam  HENT:      Head: Normocephalic and atraumatic.   Pulmonary:      Effort: Pulmonary effort is normal.   Abdominal:      Tenderness: There is no abdominal tenderness.   Musculoskeletal:      Right lower leg: No edema.      Left lower leg: No edema.   Neurological:      Mental Status: She is alert.   Psychiatric:         Behavior: Behavior is uncooperative and agitated.            Labs Reviewed and Include   Recent Labs   Lab 12/14/23  0612   *   CALCIUM 9.0   ALBUMIN 2.7*   PROT 6.4      K 3.9   CO2 28      BUN 13   CREATININE 0.7   ALKPHOS 178*   ALT 31   AST 34   BILITOT 0.3     Lab Results   Component Value Date    WBC 8.27 12/14/2023    HGB 9.6 (L) 12/14/2023    HCT 27.5 (L) 12/14/2023    MCV 70 (L) 12/14/2023     12/14/2023     Recent Labs   Lab 12/13/23  0322   TSH 0.428     Lab Results   Component  "Value Date    HGBA1C 13.6 (H) 12/12/2023       Nutritional status:   Body mass index is 18.5 kg/m².  Lab Results   Component Value Date    ALBUMIN 2.7 (L) 12/14/2023    ALBUMIN 2.7 (L) 12/13/2023    ALBUMIN 3.4 (L) 12/12/2023     No results found for: "PREALBUMIN"    Estimated Creatinine Clearance: 88.6 mL/min (based on SCr of 0.7 mg/dL).    Accu-Checks  Recent Labs     12/13/23  0819 12/13/23  1154 12/13/23  1748 12/13/23  1750 12/13/23  1827 12/13/23  2011 12/13/23  2330 12/14/23  0023 12/14/23  0115 12/14/23  0330   POCTGLUCOSE 189* 215* 51* 52* 114* 86 64* 62* 117* 101        ASSESSMENT and PLAN    Renal/  ANNA (acute kidney injury)  Titrate insulin slowly to avoid hypoglycemia as the risk of hypoglycemia increases with decreased creatinine clearance.        ID  * Severe sepsis  Managed per primary team      Endocrine  Diabetes mellitus with hyperglycemia  BG goal: 140-180  Unclear T1 vs T2.  Phenotypically more consistent with T1 and comes in with DKA. Pt not cooperative during interview as she reports she has told everyone, everything already. On insulin tandem pump at home, she reports taking it off but unclear when.  Comes in with DKA which has since resolved.   Low blood sugars with transition from insulin drip.  Will WBD patient at 0.4 and monitor.    - Adjust to Levemir 10 units daily,  - Start Novolog 4 units AC,   - Continue Novolog Moderate dose correction with ISF 25 starting at 150    - POCT Glucose before meals, at bedtime and at 2 am  - Hypoglycemia protocol in place  - Requested that pt bring her Tandem pump in with a Dexcom G6.      ** Please notify Endocrine for any change and/or advance in diet**  ** Please call Endocrine for any BG related issues **     Discharge Planning:   TBD. Please notify endocrinology prior to discharge.            Plan discussed with patient, family, and RN at bedside.     Jose Bradley PA-C  Endocrinology  [facility] - Telemetry Stepdown  "

## 2023-12-14 NOTE — ASSESSMENT & PLAN NOTE
Hgb of 10 and MCV of 77. No evidence of bleeding     - trend Hgb and transfuse <7       Recent Labs   Lab 12/12/23  0709 12/13/23  0322 12/14/23  0612   HGB 10.8* 8.5* 9.6*         Component Value Date/Time    MCV 70 (L) 12/14/2023 0612    RDW 15.7 (H) 12/14/2023 0612    IRON 71 12/13/2023 0322    FERRITIN 32 12/13/2023 0322     Monitor CBC and transfuse if H/H drops below 7/21.

## 2023-12-14 NOTE — ASSESSMENT & PLAN NOTE
Likely 2/2 to sepsis or DKA.      - continue monitoring    12/14 U tox qwith amphetamines. TSH WNL. addiction psychiatry eval

## 2023-12-14 NOTE — HOSPITAL COURSE
Patient was admitted to ICU for acute encephalopathy, DKA, and severe sepsis . BG came down with insulin gtt and gap closed. Patient was mentating better. mentating better.       12/14 Transfer to hospital medicine  started on tylenol 1000mg q 8h for abdominal pain. Maalox for heartburn. Leucocytosis and ANNA resolved. P replaced . Endocrine consulted. c/o heart burn and 8/10 abdominal pain  no relief with maalox. likely gastroparesis.  X ray abd - No significant intra-abdominal abnormality. lipase. started protonix and oxycodone PRN . CT abdomen pelvis ordered   12/15 poor oral intake. insulin dose decreased. glucose 300s.sodium trending down 131 . urine lytes . was on adderall 30 mg bid, stopped 2 months back as her psychiatrist not available for prescription.  since then smoking meth twice daily. psychiatry consulted for Methamphetamine use/ ADHD.  Nicotine patch for smoking. counseled regarding cessation   12/16 Glucose 350s today. patient had insulin pump from home set up at discharge.  abdominal pain resolved. discharge home with MUSHTAQ

## 2023-12-14 NOTE — ASSESSMENT & PLAN NOTE
Cr 2.0. likely prerenal ANNA in the setting of dehydration and DKA.      - IVF and resume diet  - trend Cr  - avoid nephrotoxic medication and renally dose   resolved    Recent Labs   Lab 12/13/23  1155 12/13/23  1751 12/14/23  0612   BUN 25* 20 13   CREATININE 1.1 1.0 0.7       I & O     Intake/Output Summary (Last 24 hours) at 12/14/2023 0825  Last data filed at 12/13/2023 1800  Gross per 24 hour   Intake 456.36 ml   Output 1800 ml   Net -1343.64 ml

## 2023-12-14 NOTE — PROGRESS NOTES
[facility] - Telemetry [facility] Medicine  Progress Note    Patient Name: Wilfrido Elias  MRN: [id number]  Patient Class: IP- Inpatient   Admission Date: 12/12/2023  Length of Stay: 2 days  Attending Physician: Chris Mcdaniel MD  Primary Care Provider: Allison, Primary Doctor        Subjective:     Principal Problem:Severe sepsis        HPI:  This is a 36 year old lady with history of diabetes who presented to the ED for altered mental status. History obtained primarily from chart review. Per EMS pt was 98% on room air with wheezing. Duoneb given enroute with improvement. Per EMS pt CBG came back as greater than 600 and is 913 on admission. Pt is unconscious and non verbal in the ED, she is not answering questions and is unrousable. Per ED note her family was concerned because she seemed a bit out of it. Per chart review she has not been taking her insulin lately.     In the ED, patietn was tachycardia, tachypnea, elevated WBC, and elevated lactic in the setting of DKA. Unclear source of infection as UA and CXR were unremarkable. Patient was admitted to ICU for further management.             Overview/Hospital Course:    Patient was admitted to ICU for acute encephalopathy, DKA, and severe sepsis . BG came down with insulin gtt and gap closed. Patient was mentating better. mentating better.       12/14 Transfer to hospital medicine  started on tylenol 1000mg q 8h for abdominal pain. Maalox for heartburn. Leucocytosis and ANNA resolved. P replaced . Endocrine consulted. c/o heart burn and 8/10 abdominal pain  no relief with maalox. likely gastroparesis.  X ray abd - No significant intra-abdominal abnormality. lipase. started protonix and oxycodone PRN . CT abdomen pelvis ordered         Review of Systems:   Pain scale:  Constitutional:  fever,  chills, headache, vision loss, hearing loss, weight loss, Generalized weakness, falls, loss of smell, loss of taste, poor appetite,  sore throat  Respiratory: cough,  shortness of breath.   Cardiovascular: chest pain, dizziness, palpitations, orthopnea, swelling of feet, syncope  Gastrointestinal: nausea, vomiting, abdominal pain, diarrhea, black stool,  blood in stool, change in bowel habits, constipation  Genitourinary: hematuria, dysuria, urgency, frequency  Integument/Breast: rash,  pruritis  Hematologic/Lymphatic: easy bruising, lymphadenopathy  Musculoskeletal: arthralgias , myalgias, back pain, neck pain, knee pain  Neurological: confusion, seizures, tremors, slurred speech  Behavioral/Psych:  depression, anxiety, auditory or visual hallucinations     OBJECTIVE:     Physical Exam:  Body mass index is 18.5 kg/m².    Constitutional: Appears  thin built   Head: Normocephalic and atraumatic.   Neck: Normal range of motion. Neck supple.   Cardiovascular: Normal heart rate.  Regular heart rhythm.  Pulmonary/Chest: Effort normal.   Abdominal: No distension.  + dpigastric tenderness  Musculoskeletal: Normal range of motion. No edema.   Neurological: Alert and oriented to person, place, and time.   Skin: Skin is warm and dry.   Psychiatric: Normal mood and affect. Behavior is normal.                  Vital Signs  Temp: 98.5 °F (36.9 °C) (12/14/23 1512)  Pulse: 99 (12/14/23 1519)  Resp: 18 (12/14/23 1719)  BP: (Abnormal) 168/104 (12/14/23 1512)  SpO2: 100 % (12/14/23 1512)     24 Hour VS Range    Temp:  [98.3 °F (36.8 °C)-98.8 °F (37.1 °C)]   Pulse:  []   Resp:  [12-30]   BP: (134-170)/()   SpO2:  [96 %-100 %]     Intake/Output Summary (Last 24 hours) at 12/14/2023 1752  Last data filed at 12/13/2023 1800  Gross per 24 hour   Intake no documentation   Output 1000 ml   Net -1000 ml         I/O This Shift:  No intake/output data recorded.    Wt Readings from Last 3 Encounters:   12/12/23 52 kg (114 lb 10.2 oz)       I have personally reviewed the vitals and recorded Intake/Output     Laboratory/Diagnostic Data:    CBC/Anemia Labs: Coags:    Recent Labs   Lab 12/12/23  9826  "12/12/23  0739 12/12/23  0945 12/13/23  0322 12/14/23  0612   WBC 23.95*  --   --  17.17* 8.27   HGB 10.8*  --   --  8.5* 9.6*   HCT 34.1*   < > 34* 24.9* 27.5*     --   --  297 260   MCV 77*  --   --  72* 70*   RDW 15.9*  --   --  15.4* 15.7*   IRON  --   --   --  71  --    FERRITIN  --   --   --  32  --     < > = values in this interval not displayed.    No results for input(s): "PT", "INR", "APTT" in the last 168 hours.     Chemistries: ABG:   Recent Labs   Lab 12/12/23  0709 12/12/23  1257 12/13/23  0322 12/13/23  0820 12/13/23  1155 12/13/23  1751 12/14/23  0612   *   < > 134*   < > 134* 138 137   K 6.6*   < > 4.2   < > 4.4 4.0 3.9   CL 94*   < > 103   < > 103 103 100   CO2 5*   < > 20*   < > 23 25 28   BUN 55*   < > 37*   < > 25* 20 13   CREATININE 2.0*   < > 1.3   < > 1.1 1.0 0.7   CALCIUM 9.7   < > 8.3*   < > 8.7 9.1 9.0   PROT 7.9  --  6.1  --   --   --  6.4   BILITOT 0.2  --  0.3  --   --   --  0.3   ALKPHOS 265*  --  182*  --   --   --  178*   ALT 53*  --  34  --   --   --  31   AST 21  --  16  --   --   --  34   MG 2.6  --  2.1  --   --   --  2.2   PHOS 7.1*  --  2.7  --   --   --  2.4*    < > = values in this interval not displayed.    Recent Labs   Lab 12/12/23  0717   PH 7.119*   PCO2 21.1*   PO2 42   HCO3 6.8*   POCSATURATED 63   BE -23*        POCT Glucose: HbA1c:    Recent Labs   Lab 12/13/23 2011 12/13/23  2330 12/14/23  0023 12/14/23  0115 12/14/23  0330 12/14/23  1153   POCTGLUCOSE 86 64* 62* 117* 101 370*    Hemoglobin A1C   Date Value Ref Range Status   12/12/2023 13.6 (H) 4.0 - 5.6 % Final     Comment:     ADA Screening Guidelines:  5.7-6.4%  Consistent with prediabetes  >or=6.5%  Consistent with diabetes    High levels of fetal hemoglobin interfere with the HbA1C  assay. Heterozygous hemoglobin variants (HbS, HgC, etc)do  not significantly interfere with this assay.   However, presence of multiple variants may affect accuracy.          Cardiac Enzymes: Ejection Fractions:  " "  Recent Labs     12/12/23  1257 12/14/23  1029   CPK 80  --    TROPONINI  --  0.014    No results found for: "EF"       No results for input(s): "COLORU", "APPEARANCEUA", "PHUR", "SPECGRAV", "PROTEINUA", "GLUCUA", "KETONESU", "BILIRUBINUA", "OCCULTUA", "NITRITE", "UROBILINOGEN", "LEUKOCYTESUR", "RBCUA", "WBCUA", "BACTERIA", "SQUAMEPITHEL", "HYALINECASTS" in the last 48 hours.    Invalid input(s): "WRIGHTSUR"      Lactate (Lactic Acid) (mmol/L)   Date Value   12/13/2023 0.9   12/12/2023 4.0 (HH)     No results found for: "BNP"  No results found for: "CRP", "SEDRATE"  No results found for: "DDIMER"  Ferritin (ng/mL)   Date Value   12/13/2023 32     No results found for: "LDH"  Troponin I (ng/mL)   Date Value   12/14/2023 0.014     CPK (U/L)   Date Value   12/12/2023 80     No results found for this or any previous visit.  SARS-CoV2 (COVID-19) Qualitative PCR (no units)   Date Value   12/12/2023 Not Detected       Microbiology labs for the last week  Microbiology Results (last 7 days)       Procedure Component Value Units Date/Time    Blood Culture #1 **CANNOT BE ORDERED STAT** [[id number]] Collected: 12/12/23 0810    Order Status: Completed Specimen: Blood from Peripheral, Antecubital, Left Updated: 12/14/23 1012     Blood Culture, Routine No Growth to date      No Growth to date      No Growth to date    Blood Culture #2 **CANNOT BE ORDERED STAT** [[id number]] Collected: 12/12/23 0810    Order Status: Completed Specimen: Blood from Peripheral, Antecubital, Right Updated: 12/14/23 1012     Blood Culture, Routine No Growth to date      No Growth to date      No Growth to date            Reviewed and noted in plan where applicable- Please see chart for full lab data.    Lines/Drains:       Peripheral IV - Single Lumen 12/12/23 1152 18 G Right Antecubital (Active)   Site Assessment Clean;Dry;Intact 12/14/23 0345   Extremity Assessment Distal to IV No abnormal discoloration 12/13/23 2000   Line Status Flushed 12/13/23 " 2000   Dressing Status Clean;Dry;Intact 12/13/23 2000   Dressing Intervention Integrity maintained 12/13/23 2000   Dressing Change Due 12/16/23 12/13/23 2000   Site Change Due 12/16/23 12/13/23 2000   Reason Not Rotated Not due 12/13/23 2000   Number of days: 1            Peripheral IV - Single Lumen 12/12/23 1901 20 G Left Wrist (Active)   Site Assessment Clean;Dry;Intact 12/14/23 0346   Extremity Assessment Distal to IV No abnormal discoloration 12/13/23 2000   Line Status Flushed 12/13/23 2000   Dressing Status Dry;Clean;Intact 12/13/23 2000   Dressing Intervention Integrity maintained 12/13/23 2000   Dressing Change Due 12/16/23 12/13/23 2000   Site Change Due 12/16/23 12/13/23 2000   Reason Not Rotated Not due 12/13/23 2000   Number of days: 1       Imaging  ECG Results              EKG 12-lead (Final result)  Result time 12/12/23 10:43:46      Final result by Interface, Lab In [facility] (12/12/23 10:43:46)               Narrative:    Test Reason : R73.9,    Vent. Rate : 113 BPM     Atrial Rate : 113 BPM     P-R Int : 162 ms          QRS Dur : 088 ms      QT Int : 350 ms       P-R-T Axes : 074 090 039 degrees     QTc Int : 480 ms    Probably  Sinus tachycardia  Possible Left atrial enlargement  Rightward axis  Possible  Anteroseptal infarct ,age undetermined  Abnormal ECG  No previous ECGs available  Confirmed by Venkat AGUILAR MD ([id number]) on 12/12/2023 10:43:36 AM    Referred By: [email or web address]   SELF           Confirmed By:Venkat AGUILAR MD                                  No results found for this or any previous visit.      X-Ray Abdomen AP 1 View  Narrative: EXAMINATION:  XR ABDOMEN AP 1 VIEW    CLINICAL HISTORY:  abd pain;    TECHNIQUE:  One view    COMPARISON:  No relevant comparison examinations are currently available.    FINDINGS:  Intestinal gas pattern appears unremarkable, with gas seen predominantly within the colon.  No significant gaseous distention of large or small bowel loops, with no findings to indicate  intestinal obstruction or significant ileus observed.  No conventional radiographic evidence of organomegaly or intra-abdominal/pelvic soft tissue mass.  No significant osseous abnormality.  Impression: No significant intra-abdominal abnormality.    Electronically signed by: Natanael Reeder MD  Date:    12/14/2023  Time:    09:09      Labs, Imaging, EKG and Diagnostic results from 12/14/2023 were reviewed.    Medications:  Medication list was reviewed and changes noted under Assessment/Plan.  No current facility-administered medications on file prior to encounter.     No current outpatient medications on file prior to encounter.     Scheduled Medications:  acetaminophen, 1,000 mg, Oral, Q8H  aluminum-magnesium hydroxide-simethicone, 30 mL, Oral, QID (AC & HS)  doxycycline, 100 mg, Oral, Q12H  enoxparin, 30 mg, Subcutaneous, Q24H (prophylaxis, 1700)  insulin aspart U-100, 4 Units, Subcutaneous, TIDWM  insulin detemir U-100, 10 Units, Subcutaneous, Daily  lactated ringers, 1,000 mL, Intravenous, Once  mirtazapine, 15 mg, Oral, Nightly  pantoprazole, 40 mg, Oral, Daily  potassium, sodium phosphates, 1 packet, Oral, QID (AC & HS)      PRN: dextrose 10%, dextrose 10%, glucagon (human recombinant), glucose, glucose, insulin aspart U-100, ondansetron, oxyCODONE, senna-docusate 8.6-50 mg, sodium chloride 0.9%, sodium chloride 0.9%, sodium chloride 0.9%  Infusions:       Estimated Creatinine Clearance: 88.6 mL/min (based on SCr of 0.7 mg/dL).             Assessment/Plan:      * Severe sepsis    36 yoF with unknown past medical history presented with tachycardia, tachypnea, elevated WBC, and elevated lactic in the setting of DKA. Unclear source of infection as UA and CXR were unremarkable. History was minimal as patient is encephalopathic. Likely source is multiple skin infections noted on patient.      - d/c vanc and zosyn and start doxycycline  - f/u Bcx and tailor antibiotics    12/14 BCX 2 NGTD. on doxycycline         Diabetes  mellitus with hyperglycemia        Acute metabolic encephalopathy    Likely 2/2 to sepsis or DKA.      - continue monitoring    12/14 U tox qwith amphetamines. TSH WNL. addiction psychiatry eval        Lactic acidosis  resolved     High anion gap metabolic acidosis    Likely 2/2 DKA vs lactic acidosis. resolved        ANNA (acute kidney injury)    Cr 2.0. likely prerenal ANNA in the setting of dehydration and DKA.      - IVF and resume diet  - trend Cr  - avoid nephrotoxic medication and renally dose   resolved    Recent Labs   Lab 12/13/23  1155 12/13/23  1751 12/14/23  0612   BUN 25* 20 13   CREATININE 1.1 1.0 0.7       I & O     Intake/Output Summary (Last 24 hours) at 12/14/2023 0825  Last data filed at 12/13/2023 1800  Gross per 24 hour   Intake 456.36 ml   Output 1800 ml   Net -1343.64 ml        Microcytic anemia    Hgb of 10 and MCV of 77. No evidence of bleeding     - trend Hgb and transfuse <7       Recent Labs   Lab 12/12/23  0709 12/13/23  0322 12/14/23  0612   HGB 10.8* 8.5* 9.6*         Component Value Date/Time    MCV 70 (L) 12/14/2023 0612    RDW 15.7 (H) 12/14/2023 0612    IRON 71 12/13/2023 0322    FERRITIN 32 12/13/2023 0322     Monitor CBC and transfuse if H/H drops below 7/21.      DKA (diabetic ketoacidosis)    BG >500 on multiple POCT glucose. BHB elevated. A1c of 13.6. repeat labs showed closed gap and acidosis mostly resolved.      - patient transitioned to basal bolus insulin  - BMP q4  - replete K as needed  - nutrition consulted    Patient's FSGs are controlled on current medication regimen.  Last A1c reviewed-   Lab Results   Component Value Date    HGBA1C 13.6 (H) 12/12/2023     Most recent fingerstick glucose reviewed-   Recent Labs   Lab 12/13/23  2330 12/14/23  0023 12/14/23  0115 12/14/23  0330   POCTGLUCOSE 64* 62* 117* 101       Antihyperglycemics (From admission, onward)      Start     Stop Route Frequency Ordered    12/14/23 1130  insulin aspart U-100 pen 4 Units         -- SubQ 3  times daily with meals 12/14/23 0919 12/14/23 1030  insulin detemir U-100 (Levemir) pen 10 Units         -- SubQ Daily 12/14/23 0919    12/14/23 1017  insulin aspart U-100 pen 0-10 Units         -- SubQ Before meals & nightly PRN 12/14/23 0919    12/12/23 1245  insulin regular in 0.9 % NaCl 100 unit/100 mL (1 unit/mL) infusion        Question Answer Comment   Initial dose (DO NOT CHANGE): 0.1 units/kg/hr    Insulin Rate Adjustment (DO NOT MODIFY ANSWER) [email or web address]\epic\Images\Pharmacy\InsulinInfusions\INSULIN ADJUSTMENT DKA version [email or web address]        12/13/23 0130 IV Continuous 12/12/23 1235          12/14 Endocrine consulted       VTE Risk Mitigation (From admission, onward)           Ordered     enoxaparin injection 30 mg  Every 24 hours         12/14/23 1246     IP VTE HIGH RISK PATIENT  Once         12/12/23 1231                    Discharge Planning   OUMOU: 12/17/2023     Code Status: Full Code   Is the patient medically ready for discharge?: (No Documentation)    Reason for patient still in hospital (select all that apply): Treatment  Discharge Plan A: Home with family                  Chris Mcdaniel MD  Department of Hospital Medicine   [facility] - Telemetry Stepdown

## 2023-12-15 PROBLEM — F15.10 METHAMPHETAMINE ABUSE: Status: ACTIVE | Noted: 2023-12-15

## 2023-12-15 PROBLEM — E87.1 HYPONATREMIA: Status: ACTIVE | Noted: 2023-12-15

## 2023-12-15 LAB
ALBUMIN SERPL BCP-MCNC: 2.7 G/DL (ref 3.5–5.2)
ALBUMIN SERPL BCP-MCNC: 2.9 G/DL (ref 3.5–5.2)
ALP SERPL-CCNC: 174 U/L (ref 55–135)
ALT SERPL W/O P-5'-P-CCNC: 26 U/L (ref 10–44)
ANION GAP SERPL CALC-SCNC: 13 MMOL/L (ref 8–16)
ANION GAP SERPL CALC-SCNC: 7 MMOL/L (ref 8–16)
AST SERPL-CCNC: 32 U/L (ref 10–40)
BASOPHILS # BLD AUTO: 0.02 K/UL (ref 0–0.2)
BASOPHILS NFR BLD: 0.5 % (ref 0–1.9)
BILIRUB SERPL-MCNC: 0.4 MG/DL (ref 0.1–1)
BUN SERPL-MCNC: 13 MG/DL (ref 6–20)
BUN SERPL-MCNC: 13 MG/DL (ref 6–20)
CALCIUM SERPL-MCNC: 9.1 MG/DL (ref 8.7–10.5)
CALCIUM SERPL-MCNC: 9.6 MG/DL (ref 8.7–10.5)
CHLORIDE SERPL-SCNC: 96 MMOL/L (ref 95–110)
CHLORIDE SERPL-SCNC: 97 MMOL/L (ref 95–110)
CO2 SERPL-SCNC: 25 MMOL/L (ref 23–29)
CO2 SERPL-SCNC: 27 MMOL/L (ref 23–29)
CREAT SERPL-MCNC: 0.7 MG/DL (ref 0.5–1.4)
CREAT SERPL-MCNC: 0.9 MG/DL (ref 0.5–1.4)
DIFFERENTIAL METHOD: ABNORMAL
EOSINOPHIL # BLD AUTO: 0.1 K/UL (ref 0–0.5)
EOSINOPHIL NFR BLD: 1.4 % (ref 0–8)
ERYTHROCYTE [DISTWIDTH] IN BLOOD BY AUTOMATED COUNT: 15.5 % (ref 11.5–14.5)
EST. GFR  (NO RACE VARIABLE): >60 ML/MIN/1.73 M^2
EST. GFR  (NO RACE VARIABLE): >60 ML/MIN/1.73 M^2
GLUCOSE SERPL-MCNC: 180 MG/DL (ref 70–110)
GLUCOSE SERPL-MCNC: 204 MG/DL (ref 70–110)
HCT VFR BLD AUTO: 29.1 % (ref 37–48.5)
HGB BLD-MCNC: 9.6 G/DL (ref 12–16)
IMM GRANULOCYTES # BLD AUTO: 0 K/UL (ref 0–0.04)
IMM GRANULOCYTES NFR BLD AUTO: 0 % (ref 0–0.5)
LYMPHOCYTES # BLD AUTO: 2.1 K/UL (ref 1–4.8)
LYMPHOCYTES NFR BLD: 47.7 % (ref 18–48)
MAGNESIUM SERPL-MCNC: 2.1 MG/DL (ref 1.6–2.6)
MCH RBC QN AUTO: 24.7 PG (ref 27–31)
MCHC RBC AUTO-ENTMCNC: 33 G/DL (ref 32–36)
MCV RBC AUTO: 75 FL (ref 82–98)
MONOCYTES # BLD AUTO: 0.4 K/UL (ref 0.3–1)
MONOCYTES NFR BLD: 8.9 % (ref 4–15)
NEUTROPHILS # BLD AUTO: 1.8 K/UL (ref 1.8–7.7)
NEUTROPHILS NFR BLD: 41.5 % (ref 38–73)
NRBC BLD-RTO: 0 /100 WBC
OSMOLALITY UR: 412 MOSM/KG (ref 50–1200)
PHOSPHATE SERPL-MCNC: 2.7 MG/DL (ref 2.7–4.5)
PHOSPHATE SERPL-MCNC: 2.9 MG/DL (ref 2.7–4.5)
PLATELET # BLD AUTO: 244 K/UL (ref 150–450)
PMV BLD AUTO: 10.5 FL (ref 9.2–12.9)
POCT GLUCOSE: 154 MG/DL (ref 70–110)
POCT GLUCOSE: 181 MG/DL (ref 70–110)
POCT GLUCOSE: 225 MG/DL (ref 70–110)
POCT GLUCOSE: 265 MG/DL (ref 70–110)
POCT GLUCOSE: 281 MG/DL (ref 70–110)
POTASSIUM SERPL-SCNC: 4.3 MMOL/L (ref 3.5–5.1)
POTASSIUM SERPL-SCNC: 4.5 MMOL/L (ref 3.5–5.1)
PROT SERPL-MCNC: 6.2 G/DL (ref 6–8.4)
RBC # BLD AUTO: 3.88 M/UL (ref 4–5.4)
SODIUM SERPL-SCNC: 131 MMOL/L (ref 136–145)
SODIUM SERPL-SCNC: 134 MMOL/L (ref 136–145)
SODIUM UR-SCNC: 55 MMOL/L (ref 20–250)
WBC # BLD AUTO: 4.36 K/UL (ref 3.9–12.7)

## 2023-12-15 PROCEDURE — 99232 PR SUBSEQUENT HOSPITAL CARE,LEVL II: ICD-10-PCS | Mod: ,,, | Performed by: PHYSICIAN ASSISTANT

## 2023-12-15 PROCEDURE — 36415 COLL VENOUS BLD VENIPUNCTURE: CPT | Performed by: HOSPITALIST

## 2023-12-15 PROCEDURE — 25000003 PHARM REV CODE 250: Performed by: HOSPITALIST

## 2023-12-15 PROCEDURE — 25000003 PHARM REV CODE 250

## 2023-12-15 PROCEDURE — 85025 COMPLETE CBC W/AUTO DIFF WBC: CPT

## 2023-12-15 PROCEDURE — 99232 SBSQ HOSP IP/OBS MODERATE 35: CPT | Mod: ,,, | Performed by: PHYSICIAN ASSISTANT

## 2023-12-15 PROCEDURE — 83735 ASSAY OF MAGNESIUM: CPT

## 2023-12-15 PROCEDURE — 99222 1ST HOSP IP/OBS MODERATE 55: CPT | Mod: AF,HB,, | Performed by: PSYCHIATRY & NEUROLOGY

## 2023-12-15 PROCEDURE — S4991 NICOTINE PATCH NONLEGEND: HCPCS | Performed by: HOSPITALIST

## 2023-12-15 PROCEDURE — 63600175 PHARM REV CODE 636 W HCPCS: Performed by: HOSPITALIST

## 2023-12-15 PROCEDURE — 83935 ASSAY OF URINE OSMOLALITY: CPT | Performed by: HOSPITALIST

## 2023-12-15 PROCEDURE — 84100 ASSAY OF PHOSPHORUS: CPT

## 2023-12-15 PROCEDURE — 80053 COMPREHEN METABOLIC PANEL: CPT

## 2023-12-15 PROCEDURE — 80069 RENAL FUNCTION PANEL: CPT | Performed by: HOSPITALIST

## 2023-12-15 PROCEDURE — 20600001 HC STEP DOWN PRIVATE ROOM

## 2023-12-15 PROCEDURE — 25000003 PHARM REV CODE 250: Performed by: PHYSICIAN ASSISTANT

## 2023-12-15 PROCEDURE — 84300 ASSAY OF URINE SODIUM: CPT | Performed by: HOSPITALIST

## 2023-12-15 PROCEDURE — 36415 COLL VENOUS BLD VENIPUNCTURE: CPT

## 2023-12-15 PROCEDURE — 99222 PR INITIAL HOSPITAL CARE,LEVL II: ICD-10-PCS | Mod: AF,HB,, | Performed by: PSYCHIATRY & NEUROLOGY

## 2023-12-15 RX ORDER — NICOTINE 7MG/24HR
1 PATCH, TRANSDERMAL 24 HOURS TRANSDERMAL DAILY
Status: DISCONTINUED | OUTPATIENT
Start: 2023-12-15 | End: 2023-12-16 | Stop reason: HOSPADM

## 2023-12-15 RX ADMIN — INSULIN ASPART 2 UNITS: 100 INJECTION, SOLUTION INTRAVENOUS; SUBCUTANEOUS at 08:12

## 2023-12-15 RX ADMIN — PANTOPRAZOLE SODIUM 40 MG: 40 TABLET, DELAYED RELEASE ORAL at 08:12

## 2023-12-15 RX ADMIN — INSULIN ASPART 4 UNITS: 100 INJECTION, SOLUTION INTRAVENOUS; SUBCUTANEOUS at 08:12

## 2023-12-15 RX ADMIN — ALUMINUM HYDROXIDE, MAGNESIUM HYDROXIDE, AND SIMETHICONE 30 ML: 200; 200; 20 SUSPENSION ORAL at 08:12

## 2023-12-15 RX ADMIN — DOXYCYCLINE HYCLATE 100 MG: 100 TABLET, FILM COATED ORAL at 08:12

## 2023-12-15 RX ADMIN — ACETAMINOPHEN 1000 MG: 500 TABLET ORAL at 09:12

## 2023-12-15 RX ADMIN — INSULIN ASPART 4 UNITS: 100 INJECTION, SOLUTION INTRAVENOUS; SUBCUTANEOUS at 12:12

## 2023-12-15 RX ADMIN — INSULIN ASPART 2 UNITS: 100 INJECTION, SOLUTION INTRAVENOUS; SUBCUTANEOUS at 05:12

## 2023-12-15 RX ADMIN — ALUMINUM HYDROXIDE, MAGNESIUM HYDROXIDE, AND SIMETHICONE 30 ML: 200; 200; 20 SUSPENSION ORAL at 12:12

## 2023-12-15 RX ADMIN — OXYCODONE HYDROCHLORIDE 5 MG: 5 TABLET ORAL at 08:12

## 2023-12-15 RX ADMIN — INSULIN ASPART 4 UNITS: 100 INJECTION, SOLUTION INTRAVENOUS; SUBCUTANEOUS at 05:12

## 2023-12-15 RX ADMIN — NICOTINE 1 PATCH: 7 PATCH, EXTENDED RELEASE TRANSDERMAL at 02:12

## 2023-12-15 RX ADMIN — INSULIN DETEMIR 4 UNITS: 100 INJECTION, SOLUTION SUBCUTANEOUS at 08:12

## 2023-12-15 RX ADMIN — INSULIN ASPART 6 UNITS: 100 INJECTION, SOLUTION INTRAVENOUS; SUBCUTANEOUS at 12:12

## 2023-12-15 RX ADMIN — INSULIN DETEMIR 10 UNITS: 100 INJECTION, SOLUTION SUBCUTANEOUS at 08:12

## 2023-12-15 RX ADMIN — INSULIN ASPART 6 UNITS: 100 INJECTION, SOLUTION INTRAVENOUS; SUBCUTANEOUS at 08:12

## 2023-12-15 RX ADMIN — ENOXAPARIN SODIUM 30 MG: 30 INJECTION SUBCUTANEOUS at 05:12

## 2023-12-15 RX ADMIN — ALUMINUM HYDROXIDE, MAGNESIUM HYDROXIDE, AND SIMETHICONE 30 ML: 200; 200; 20 SUSPENSION ORAL at 05:12

## 2023-12-15 RX ADMIN — MIRTAZAPINE 15 MG: 15 TABLET, ORALLY DISINTEGRATING ORAL at 08:12

## 2023-12-15 RX ADMIN — ACETAMINOPHEN 1000 MG: 500 TABLET ORAL at 02:12

## 2023-12-15 NOTE — ASSESSMENT & PLAN NOTE
BG goal: 140-180   Phenotypically  and presentation with DKA more consistent with T1DM. Pt not cooperative during interview as she reports she has told everyone, everything already. On insulin tandem pump at home, she reports taking it off but unclear when.  Comes in with DKA which has since resolved.   BG improving, but elevating overnight.  Likely will need adjustment to her basal to BID.  She is working to get her cousin to deliver tandem pump and dexcom.    - Adjust to Levemir 6 units BID tomorrow.  Give additional dose of 4 units Levemir tonight.  - Continue Novolog 4 units AC,   - Continue Novolog Moderate dose correction with ISF 25 starting at 150    - POCT Glucose before meals, at bedtime and at 2 am  - Hypoglycemia protocol in place  - Requested that pt bring her Tandem pump in with a Dexcom G6.      ** Please notify Endocrine for any change and/or advance in diet**  ** Please call Endocrine for any BG related issues **     Discharge Planning:   TBD. Please notify endocrinology prior to discharge.

## 2023-12-15 NOTE — SUBJECTIVE & OBJECTIVE
"Interval HPI:   No acute events overnight. Patient in room 8084/8084 A. Blood glucose variable. BG at, above, and below goal on current insulin regimen (SSI, prandial, and basal insulin ). Steroid use- None .      Renal function- Normal   Vasopressors-  None     Diet diabetic 1500 Calorie; Standard Tray     Eatin%  Nausea: No  Hypoglycemia and intervention: No  Fever: No  TPN and/or TF: No    BP (!) 152/91 (BP Location: Left arm, Patient Position: Lying)   Pulse 98   Temp 99.1 °F (37.3 °C) (Oral)   Resp 18   Ht 5' 6" (1.676 m)   Wt 52 kg (114 lb 10.2 oz)   SpO2 98%   BMI 18.50 kg/m²     Labs Reviewed and Include    Recent Labs   Lab 12/15/23  0345   *   CALCIUM 9.1   ALBUMIN 2.7*   PROT 6.2   *   K 4.5   CO2 27   CL 97   BUN 13   CREATININE 0.7   ALKPHOS 174*   ALT 26   AST 32   BILITOT 0.4     Lab Results   Component Value Date    WBC 4.36 12/15/2023    HGB 9.6 (L) 12/15/2023    HCT 29.1 (L) 12/15/2023    MCV 75 (L) 12/15/2023     12/15/2023     Recent Labs   Lab 23  0322   TSH 0.428     Lab Results   Component Value Date    HGBA1C 13.6 (H) 2023       Nutritional status:   Body mass index is 18.5 kg/m².  Lab Results   Component Value Date    ALBUMIN 2.7 (L) 12/15/2023    ALBUMIN 2.7 (L) 2023    ALBUMIN 2.7 (L) 2023     No results found for: "PREALBUMIN"    Estimated Creatinine Clearance: 88.6 mL/min (based on SCr of 0.7 mg/dL).    Accu-Checks  Recent Labs     23  1827 23  2330 23  0023 23  0115 23  0330 23  1153 23  1655 23  2119 12/15/23  0204   POCTGLUCOSE 114* 86 64* 62* 117* 101 370* 237* 110 181*       Current Medications and/or Treatments Impacting Glycemic Control  Immunotherapy:    Immunosuppressants       None          Steroids:   Hormones (From admission, onward)      None          Pressors:    Autonomic Drugs (From admission, onward)      None          Hyperglycemia/Diabetes " Medications:   Antihyperglycemics (From admission, onward)      Start     Stop Route Frequency Ordered    12/14/23 1130  insulin aspart U-100 pen 4 Units         -- SubQ 3 times daily with meals 12/14/23 0919    12/14/23 1030  insulin detemir U-100 (Levemir) pen 10 Units         -- SubQ Daily 12/14/23 0919    12/14/23 1017  insulin aspart U-100 pen 0-10 Units         -- SubQ Before meals & nightly PRN 12/14/23 0919 12/12/23 1245  insulin regular in 0.9 % NaCl 100 unit/100 mL (1 unit/mL) infusion        Question Answer Comment   Initial dose (DO NOT CHANGE): 0.1 units/kg/hr    Insulin Rate Adjustment (DO NOT MODIFY ANSWER) [email or web address]\epic\Images\Pharmacy\InsulinInfusions\INSULIN ADJUSTMENT DKA version [id number]        12/13/23 0130 IV Continuous 12/12/23 1230

## 2023-12-15 NOTE — CONSULTS
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 UDS +amphetamine. Psych consulted for methamphetamine use and ADHD.      Plan:  With reasonable medical certainty, based on history, chart review, available collateral information, and a present-state examination:  - PEC is not indicated  - recommend patient enroll in addiction rehab program after discharge and medical stabilization  - counseled on full abstinence from alcohol and substances of abuse (illicit and prescription)  - full engagement in 12 step (or equivalent) recovery program(s), including meeting attendance and acquisition/maintenance of sponsor  - relapse prevention and motivational interviewing provided  - provided resources for various addiction rehabilitation options as part of aftercare planning  - recommend Case Management assistance to establish follow-up w/ ACT team  - defer psych medication management to outpt setting  - recommend referral to outpt Psychology for therapy  - nicotine patch  - Psychiatry will sign off. Please reach out w/ any questions/concerns.    PRESENTATION:     Wilfrido Elias presents with the following chief complaint: problematic substance use/abuse    Per Chart:  This is a 36 year old lady with history of diabetes who presented to the ED for altered mental status. History obtained primarily from chart review. Per EMS pt was 98% on room air with wheezing. Duoneb given enroute with improvement. Per EMS pt CBG came back as greater than 600 and is 913 on admission. Pt is  "unconscious and non verbal in the ED, she is not answering questions and is unrousable. Per ED note her family was concerned because she seemed a bit out of it. Per chart review she has not been taking her insulin lately.     In the ED, patietn was tachycardia, tachypnea, elevated WBC, and elevated lactic in the setting of DKA. Unclear source of infection as UA and CXR were unremarkable. Patient was admitted to ICU for further management.     Per Critical Care 12/13:  # sepsis: likely due to DKA, however concerning skin lesions concerning for injections.     Per Patient:  - was on adderall 30 mg bid, stopped couple months ago b/c [place]  - reports she started smoking meth 2 mo ago  - smoking meth 2x daily  - reports was helping her focus  - has not been sleeping much lately  - taking xanax to sleep at night, prescribed to her, reports takes as prescribed  - picks at skin d/t "nerves"  - reports skin-picking is chronic issue that predates her meth use  - denies other substance use and IVDU  - [facility] wants her to get set up w/ ACT team    Collateral:   CousinMaritza  [phone]      REVIEW OF SYSTEMS:  I[]I Patient denies any pertinent ROS, and none is known.  I[]I Patient unable or unwilling to provide any ROS.    [x] Y  [] N  sleep disturbance: **  Positive for: initial insomnia, middle insomnia  [] Y  [x] N  appetite/weight change: **    [] Y  [x] N  fatigue/anergia: **    [x] Y  [] N  impairment in focus/concentration: **  Positive for: diminished ability to think or concentrate     [x] Y  [] N  depression: **     [x] Y  [] N  anxiety/worry: **  Positive for: more anxiety than the average person   [x] Y  [] N  dysregulated mood/behavior: **  Positive for: mood goes "up and down"   [] Y  [x] N  manic symptomatology: **     [x] Y  [] N  psychosis: **  Positive for: paranoia, occasional AH when using meth and when not using    Positive for: skin " picking     A pertinent medical review of systems was performed with the following notable findings: see above    CURRENT PSYCHOTROPIC REGIMEN:  I[x]I Y  I[]I N  I[]I U    Remeron 15 mg qhs (in hospital)      ADDICTION:     I[x]I Y  I[]I N  I[]I U  I[x]I Current  I[]I Former  Nicotine Use: 3-4 cigarettes/d x25 y  I[]I Y  I[x]I N  I[]I U  I[]I Current  I[]I Former  Alcohol Use:   I[]I Y  I[x]I N  I[]I U  I[]I Current  I[]I Former  Alcohol Misuse/Abuse:   I[x]I Y  I[]I N  I[]I U  I[x]I Current  I[]I Former  Illicit Drug Use/Misuse/Abuse:   I[]I Y  I[x]I N  I[]I U  I[]I Current  I[]I Former  Misuse/Abuse of Rx Medications:   I[]I Cannabis  I[]I Cocaine  I[]I Heroin  I[x]I Meth  I[]I Opioids  I[x]I Stimulants  I[]I Benzos  I[]I Other:     I[]I N/A  I[]I U  Substance(s) of Choice: methamphetamine  I[]I N/A  I[]I U  Substance(s) Used Currently/Recently: methamphetamine  I[]I N/A  I[]I U  Alcohol Consumption:  denies  I[]I N/A  I[]I U  Last Drink: denies  I[]I N/A  I[]I U  Last Drug Use: 12/10/23  I[]I N/A  I[]I U  Duration of Sobriety/Abstinence: n/a    I[]I Y  I[x]I N  I[]I U  Hx of Detox:   I[]I Y  I[x]I N  I[]I U  Hx of Rehab:   I[]I Y  I[x]I N  I[]I U  Hx of IVDU:   I[]I Y  I[x]I N  I[]I U  Hx of Accidental Overdose:   I[]I Y  I[x]I N  I[]I U  Hx of DUI:   I[]I Y  I[x]I N  I[]I U  Hx of Complicated Withdrawal (i.e. Seizures and/or Delirium Tremens):   I[x]I Y  I[]I N  I[]I U  Hx of Known/Suspected Substance-Induced Psychiatric Disorder:   I[]I Y  I[x]I N  I[]I U  Hx of Medication Assisted Treatment:   I[]I Y  I[x]I N  I[]I U  Hx of Twelve Step Program (or Equivalent) Involvement:   I[]I Y  I[x]I N  I[]I U  Currently Exhibits Signs of Intoxication:   I[]I Y  I[x]I N  I[]I U  Currently Exhibits Signs of Withdrawal:   I[]I Y  I[x]I N  I[]I U  Currently Active in Recovery:   I[x]I Y  I[]I N  I[]I U  Social Support:   I[x]I Y  I[]I N  I[]I U  Spouse/Partner Consumption:     I[]I N/A  I[x]I Y  I[]I N  I[]I U   "Acknowledges/Accepts Addiction:   I[]I N/A  I[x]I Y  I[]I N  I[]I U  Advised to Quit/Cut Back:   I[]I N/A  I[x]I Y  I[]I N  I[]I U  Alcohol/Drug Cessation ("Wants to Quit"): Interested in Quitting, Advised to Quit , Encouragement Provided, Motivational Interviewing Provided, Resources Provided  I[]I N/A  I[x]I Y  I[]I N  I[]I U  Motivation to Pursue Treatment: Moderate, Agreeable, Actively Engaged in Twelve Step Program (or Equivalent)  I[]I N/A  I[x]I Y  I[]I N  I[]I U  Tobacco Cessation ("Wants to Quit"): interested in quitting, encouragement provided    DSM-5-TR SUBSTANCE USE DISORDER CRITERIA:     -- Impaired Control:  I[x]I Y  I[]I N  I[]I U  I[]I A  I[]I D  Often take in larger amounts or over a longer period of time than was intended:   I[x]I Y  I[]I N  I[]I U  I[]I A  I[]I D  Persistent desire or unsuccessful efforts to cut down or control use:   I[]I Y  I[x]I N  I[]I U  I[]I A  I[]I D  Great deal of time spent in activities necessary to obtain substance, use, or recover from effects:   I[x]I Y  I[]I N  I[]I U  I[]I A  I[]I D  Craving/strong desire for substance or urge to use:   -- Social Impairment:  I[x]I Y  I[]I N  I[]I U  I[]I A  I[]I D  Use resulting in failure to fulfill major role obligations at home, work or school:   I[]I Y  I[]I N  I[]I U  I[x]I A  I[]I D  Social, occupational, recreational activities decreased because of use:   I[x]I Y  I[]I N  I[]I U  I[]I A  I[]I D  Continued use despite having persistent or recurrent social or interpersonal problems caused or exacerbated by the substance:   -- Risky Use:  I[]I Y  I[x]I N  I[]I U  I[]I A  I[]I D  Recurrent use in situations in which it is physically hazardous:   I[x]I Y  I[]I N  I[]I U  I[]I A  I[]I D  Use despite physical or psychological problems that are likely to have been caused or exacerbated by the substance:   -- Neuroadaptation:  I[x]I Y  I[]I N  I[]I U  I[]I A  I[]I D  Tolerance, as defined by either of the following: (1) a need for " markedly increased amounts of substance to achieve intoxication or desired effect.  -OR- (2) a markedly diminished effect with continued use of the same amount of substance:   I[x]I Y  I[]I N  I[]I U  I[]I A  I[]I D  Withdrawal, as manifested by either of the following: (1) the characteristic withdrawal syndrome for substance.  -OR- (2) substance is taken to relieve or avoid withdrawal symptoms:   -- Mild (1-3), Moderate (4-5), Severe (?6)    I[]I N/A  I[x]I Y  I[]I N  I[]I U  I[]I A  I[]I D  Active Substance Use Disorder:       HISTORY:     I[]I Patient denies any history, and none is known.  I[]I Patient unable or unwilling to provide any history.    I[x]I Y  I[]I N  I[]I U  Psychiatric Diagnoses: depression, anxiety, bipolar d/o, schizophrenia  I[]I Y  I[x]I N  I[]I U  Current Psychiatric Provider (if Applicable): has not seen in ~6 mo  I[x]I Y  I[]I N  I[]I U  Hx of Psychiatric Hospitalization:   I[x]I Y  I[]I N  I[]I U  Hx of Outpatient Psychiatric Treatment (psychiatry/psychotherapy):   I[x]I Y  I[]I N  I[]I U  Psychotropic Trials: depakote, lithium, tegretol, risperidone, wellbutrin, haldol, abilify, geodon, lyrica, gabapentin, xanax, ativan  I[]I Y  I[x]I N  I[]I U  Prior Suicide Attempts:    I[]I Y  I[x]I N  I[]I U  Hx of Suicidal Ideation:   I[]I Y  I[x]I N  I[]I U  Hx of Homicidal Ideation:   I[]I Y  I[x]I N  I[]I U  Hx of Self-Injurious Behavior (Non-Suicidal):   I[x]I Y  I[]I N  I[]I U  Hx of Violence:   I[]I Y  I[]I N  I[x]I U  Documented Hx of Malingering:     FAMILY HISTORY:  I[x]I Y  I[]I N  I[]I U    Substance use and bipolar on both sides    I[x]I Y  I[]I N  I[]I U  Hx of Trauma/Neglect:   I[x]I Y  I[]I N  I[]I U  Hx of Physical Abuse:   I[x]I Y  I[]I N  I[]I U  Hx of Sexual Abuse:   I[x]I Y  I[]I N  I[]I U  Grew Up Locally?:   I[]I Y  I[]I N  I[x]I U  Happy Childhood?:   I[x]I Y  I[]I N  I[]I U  Significant Developmental Delay/Disability?:   I[]I Y  I[x]I N  I[]I U  GED/High School Dipoloma?:  "10th   I[]I Y  I[x]I N  I[]I U  Post High School Education?:   I[]I Y  I[x]I N  I[]I U  Currently Employed?: works as  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), formerly worked maintenance for [employer]  I[x]I Y  I[]I N  I[]I U  On or Applying for Disability?: applying   I[]I Y  I[x]I N  I[]I U  Functions Independently?: cousin is her  nurse  I[]I Y  I[x]I N  I[]I U  Financially Stable?: "devyn sorta"  I[x]I Y  I[]I N  I[]I U  Domiciled?:   I[]I Y  I[x]I N  I[]I U  Lives Alone?: w/ cousin and uncle, intermittently homeless  I[]I Y  I[x]I N  I[]I U  Heterosexual/Cisgender?:   I[x]I Y  I[]I N  I[]I U  Currently in a Romantic Relationship?:  w/ wife  I[x]I Y  I[]I N  I[]I U  Ever ?:   I[x]I Y  I[]I N  I[]I U  Children/Dependents?: 4  I[x]I Y  I[]I N  I[]I U  Baptism/Spiritual?:   I[]I Y  I[]I N  I[x]I U   History?:   I[]I Y  I[x]I N  I[]I U  Current Legal Issues:   I[]I Y  I[x]I N  I[]I U  Past Charges/Convictions:   I[x]I Y  I[]I N  I[]I U  Hx of Incarceration: 10 y  I[x]I Y  I[]I N  I[]I U  Engaged in Hobbies/Recreational Activities?:   I[]I Y  I[x]I N  I[]I U  Access to a Gun?: denies  NOTE: patient counseled on gun safety, including safe storage.  NOTE: patient counseled on inherent risks associated with gun ownership.    I[]I Y  I[x]I N  I[]I U  Hx of Seizure:   I[]I Y  I[x]I N  I[]I U  Hx of Significant Head Trauma (e.g., Loss of Consciousness, Concussion, Coma):    I[x]I Y  I[]I N  I[]I U  Medical History & Diagnoses:       The patient's past medical history has been reviewed and updated as appropriate within the electronic medical record system.    Severe sepsis    DKA (diabetic ketoacidosis)    Microcytic anemia    ANNA (acute kidney injury)    High anion gap metabolic acidosis    Lactic acidosis    Acute metabolic encephalopathy    Diabetes mellitus with hyperglycemia    Hyponatremia    Methamphetamine abuse     Scheduled and PRN Medications: The electronic chart was reviewed and updated as " appropriate.  See Medcard for details.    Current Facility-Administered Medications:     acetaminophen tablet 1,000 mg, 1,000 mg, Oral, Q8H, Chris Mcdaniel MD, 1,000 mg at 12/14/23 2121    aluminum-magnesium hydroxide-simethicone 200-200-20 mg/5 mL suspension 30 mL, 30 mL, Oral, QID (AC & HS), Chris Mcdaniel MD, 30 mL at 12/15/23 0555    dextrose 10% bolus 125 mL 125 mL, 12.5 g, Intravenous, PRN, Jose Bradley PA-C    dextrose 10% bolus 250 mL 250 mL, 25 g, Intravenous, PRN, Jose Bradley PA-C    doxycycline tablet 100 mg, 100 mg, Oral, Q12H, Mahamed Hernandez MD, 100 mg at 12/15/23 0811    enoxaparin injection 30 mg, 30 mg, Subcutaneous, Q24H (prophylaxis, 1700), Chris Mcdaniel MD, 30 mg at 12/14/23 1719    glucagon (human recombinant) injection 1 mg, 1 mg, Intramuscular, PRN, Jose Bradley PA-C    glucose chewable tablet 16 g, 16 g, Oral, PRN, Jose Bradley PA-C    glucose chewable tablet 24 g, 24 g, Oral, PRN, Jose Bradley PA-C    insulin aspart U-100 pen 0-10 Units, 0-10 Units, Subcutaneous, QID (AC + HS) PRN, Jose Bradley PA-C, 6 Units at 12/15/23 0800    insulin aspart U-100 pen 4 Units, 4 Units, Subcutaneous, TIDWM, Jose Bradley PA-C, 4 Units at 12/15/23 0811    insulin detemir U-100 (Levemir) pen 4 Units, 4 Units, Subcutaneous, QHS, Jose Bradley PA-C    [START ON 12/16/2023] insulin detemir U-100 (Levemir) pen 6 Units, 6 Units, Subcutaneous, BID, Messonnier, Christopher J., PA-C    lactated ringers bolus 1,000 mL, 1,000 mL, Intravenous, Once, Noah Leonard MD    mirtazapine disintegrating tablet 15 mg, 15 mg, Oral, Nightly, Mahamed Hernandez MD, 15 mg at 12/14/23 2048    ondansetron injection 4 mg, 4 mg, Intravenous, Q6H PRN, Mónica Stringer MD, 4 mg at 12/13/23 1329    oxyCODONE immediate release tablet 5 mg, 5 mg, Oral, Q6H PRN, Chris Mcdaniel MD, 5 mg at 12/15/23 0811     "pantoprazole EC tablet 40 mg, 40 mg, Oral, Daily, Chris Mcdaniel MD, 40 mg at 12/15/23 0811    senna-docusate 8.6-50 mg per tablet 1 tablet, 1 tablet, Oral, Daily PRN, Chris Mcdaniel MD    sodium chloride 0.9% flush 10 mL, 10 mL, Intravenous, PRN, Silvestre Pulliam MD    sodium chloride 0.9% flush 10 mL, 10 mL, Intravenous, PRN, Mónica Stringer MD    sodium chloride 0.9% flush 10 mL, 10 mL, Intravenous, PRN, Mónica Stringer MD    Allergies:  Patient has no allergy information on record.    PSYCHOSOCIAL FACTORS:  Stressors (Biopsychosocial, Cultural and Environmental): finances, housing, marriage, physical health  Functioning Relationships: good support system, strained with spouse or significant others, and good relationship with children    STRENGTHS AND LIABILITIES:   Strength: Patient accepts guidance/feedback.  Strength: Patient is motivated for change.  Strength: Patient is accepting of treatment.  Strength: Patient is seeking help.  Liability: Patient has poor health.  Liability: Patient is acutely decompensated.  Liability: Patient has unstable housing.    Additional Relevant History, As Applicable:       EXAMINATION:     /87 (BP Location: Left arm, Patient Position: Lying)   Pulse 98   Temp 99.1 °F (37.3 °C) (Oral)   Resp 18   Ht 5' 6" (1.676 m)   Wt 52 kg (114 lb 10.2 oz)   SpO2 (!) 94%   BMI 18.50 kg/m²     MENTAL STATUS EXAMINATION:  General Appearance: **  dressed in hospital garb, lying in bed, in no acute distress, diffuse excoriations  Behavior: **  cooperative, under good behavioral control, friendly  Involuntary Movements and Motor Activity: **  no abnormal involuntary movements noted  Gait and Station: **  unable to assess - patient lying down or seated  Speech and Language: **  conversational, spontaneous, fluent English, garbled  Mood: "up and down"  Affect: **  appropriate to situation and context  Thought Process and Associations: **  linear and goal-directed, with no " loosening of associations  Thought Content and Perceptions: **  no suicidal or homicidal ideation, not responding to internal stimuli, reports occasional AH, denies AVH at present, reports occasional paranoid ideation  Sensorium: **  alert with clear sensorium, oriented fully (to person, place, and time)  Recent and Remote Memory: **  recent memory intact, remote memory intact  Attention and Concentration: **  grossly intact, attentive to conversation  Fund of Knowledge: **  grossly intact  Insight: **  intact, demonstrates awareness of illness and situation  Judgment: **  intact, behavior is adequate/appropriate given the circumstances      RISK MANAGEMENT:     I[]I Y  I[x]I N  I[]I U  I[]I A  Suicidal Ideation/Behavior: **   I[]I Y  I[x]I N  I[]I U  I[]I A  Homicidal Ideation/Behavior: **  I[]I Y  I[x]I N  I[]I U  I[]I A  Violence: **  I[]I Y  I[x]I N  I[]I U  I[]I A  Self-Injurious Behavior: **    The patient is deemed to be a historian of unknown reliability and accuracy.    I[]I Y  I[]I N  I[]I U  I[x]I A  I[]I N/A  Minimization of Risk Parameters Suspected/Evident: **  I[]I Y  I[x]I N  I[]I U  I[]I A  I[]I N/A  Exaggeration of Risk Parameters Suspected/Evident: **    [] Y  [x] N  Danger to Self:   [] Y  [x] N  Danger to Others:   [] Y  [x] N  Grave Disability:       In cases of emergency, daily coverage provided by Acute/ER Psych MD, NP, PA, or SW, with contact numbers located in [facility] On Call Schedule.      Boaz Sutherland MD  [facility], PGY-II        KEY:     I[]I Y = Yes / Present / Endorses  I[]I N = No / Absent / Denies  I[]I U = Unknown / Unable to Assess / Unwilling to Participate  I[]I A = Ambiguity Exists / Accuracy Uncertain  I[]I D = Denial or Minimization is Suspected/Evident  I[]I N/A = Non-Applicable    CHART REVIEW:     Available documentation has been reviewed, and pertinent elements of the chart have been incorporated into this evaluation where  appropriate.    The patient's last Epic encounter in the psychiatry department was on: Visit date not found  The patient's first Epic encounter in the psychiatry department was on:      LA/MS  [facility]  Site reviewed - No recent discrepancies or irregularities are noted.    ADVICE AND COUNSELING:     [x] In cases of emergencies (e.g. SI/HI resulting in danger to self or others, functioning deteriorates to the level of grave disability), call 911 or [phone], or present to the emergency department for immediate assistance.  [x] Patient should not operate a motor vehicle or heavy machinery if effects of medications or underlying symptoms/condition impair the ability to safely do so.    Alcohol, Tobacco, and Drug Counseling, as well as resources, has been provided, as warranted.     Shared medical decision making and informed consent are the hallmark and bedrock of good clinical care, and as such have been employed and obtained, respectively, to the degree possible.      Risk Mitigation Strategies, Harm Reduction Techniques, and Safety Netting are important interventions that can reduce acute and chronic risk, and as such have been employed to the degree possible.    Prescription Drug Management entails the review, recommendation, or consideration without recommendation of medications, and as such was employed during the encounter.    Additional Psychoeducation has been provided, as warranted.    Discussed, to the extent possible, diagnosis, risks and benefits of proposed treatment vs alternative treatments vs no treatment, potential side effects of these treatments and the inherent unpredictability of treatment. The patient expresses understanding of the above and displays the capacity to agree with this treatment given said understanding. Patient also agrees that, currently, the benefits outweigh the risks and consents to treatment at this time.     Written material Resources have been provided to supplement, augment, and  reinforce any discussions and interventions, via the AVS or other pre-printed handouts, as warranted.      DIAGNOSTIC TESTING:     The chart was reviewed for recent diagnostic procedures and investigations, and pertinent results are noted below.    Na 131 (L)  12/15/2023   K 4.5  12/15/2023   Ca 9.1  12/15/2023  Phos 2.9  12/15/2023   Mg 2.1  12/15/2023     Glu 204 (H)  12/15/2023   HgA1c 13.6 (H)  12/12/2023    BUN 13  12/15/2023   Cr 0.7  12/15/2023   GFR >60.0  12/15/2023   Specific Gravity 1.020  12/12/2023   Protein (Urine) Negative  12/12/2023   Microalbumin *   *     T Prot 6.2  12/15/2023   Alb 2.7 (L)  12/15/2023   T Bili 0.4  12/15/2023   Alk Phos 174 (H)  12/15/2023   AST 32  12/15/2023   ALT 26  12/15/2023   GGT *   *   NH3 *   *   Amylase *   *   Lipase 12  12/14/2023    TSH 0.428  12/13/2023   Free T4 *   *  PTH *   *  Prolactin *   *   CPK 80  12/12/2023   Troponin I 0.014  12/14/2023   PT *   *   INR *   *    WBC 4.36  12/15/2023   RBC 3.88 (L)  12/15/2023   Hgb 9.6 (L)  12/15/2023   HCT 29.1 (L)  12/15/2023   MCV 75 (L)  12/15/2023    12/15/2023   ANC 1.8; 41.5;   12/15/2023    Cholesterol *   *   Triglycerides *   *   LDL *   *   HDL *   *     B12 *   *   Folate *   *   Thiamine *   *   Vit D *   *     HIV 1/2 Ag/Ab Non-reactive  12/12/2023   Hep B Surface *   *   Hep B Core *   *   Hep A *   *   Hep C Non-reactive  12/12/2023   RPR *   *     Lithium *   *   VPA *   *   Clozapine *   *     Alcohol <10  12/12/2023   Benzodiazepines Negative  12/12/2023   Barbiturates Negative  12/12/2023   Cannabis Negative  12/12/2023   Cocaine Negative  12/12/2023   Amphetamines Presumptive Positive (A)  12/12/2023   PCP Negative  12/12/2023   Opiates Negative  12/12/2023   Methadone Negative  12/12/2023   Buprenorphine *   *   Fentanyl *   *   Oxycodone *   *   Tramadol *   *     Ethanol *   *  PETH *   *   EtG *   *   EtS *   *    Buprenorphine *   *   Norbuprenorphine *   *     Results for orders placed or performed during the hospital encounter of 12/12/23   EKG 12-lead    Collection Time: 12/14/23 10:44 AM    Narrative    Test Reason : R07.9,    Vent. Rate : 102 BPM     Atrial Rate : 102 BPM     P-R Int : 146 ms          QRS Dur : 082 ms      QT Int : 340 ms       P-R-T Axes : 067 072 052 degrees     QTc Int : 443 ms    Sinus tachycardia  Low voltage QRS  Low septal forces Very slight inferolateral MAGO  Abnormal ECG  When compared with ECG of 12-DEC-2023 06:37,  Questionable change in initial forces of Anterior leads  Confirmed by Venkat AGUILAR MD ([id number]) on 12/14/2023 9:24:55 PM    Referred By: [email or web address]   SELF           Confirmed By:Venkat AGUILAR MD       Results for orders placed or performed during the hospital encounter of 12/12/23   CT Head Without Contrast    Narrative    EXAMINATION:  CT HEAD WITHOUT CONTRAST    CLINICAL HISTORY:  Mental status change, unknown cause;    TECHNIQUE:  Low dose axial images were obtained through the head.  Coronal and sagittal reformations were also performed. Contrast was not administered.    COMPARISON:  None.    FINDINGS:  Mild artifact from beam hardening.    The brain parenchyma appears normal for age with good corticomedullary differentiation.  There is no evidence of acute infarct, hemorrhage, or mass.  The ventricular system is normal in size.  No mass-effect or midline shift.  There are no abnormal extra-axial fluid collections.  The paranasal sinuses and mastoid air cells are clear.  The calvarium appears intact.  .      Impression    Mild artifact from beam hardening.    No acute intracranial abnormalities.      Electronically signed by: Shamir Dumont MD  Date:    12/12/2023  Time:    21:04       CONSULTATION:     A diagnostic psychiatric evaluation was performed and responsiveness to treatment was assessed.  The patient demonstrates adequate ability/capacity to respond to  treatment.    Inpatient consult to Psychiatry  Consult performed by: Boaz Sutherland MD  Consult ordered by: Chris Mcdaniel MD          - The treatment team was informed of the encounter documentation.

## 2023-12-15 NOTE — DISCHARGE INSTRUCTIONS
REFERRAL RECOMMENDATIONS FOR SUBSTANCE ABUSE & MENTAL HEALTH      IN CASE OF SUICIDAL THINKING, call the National Suicide Hotline Number: 988    988 Suicide & Crisis Lifeline: 980 , 2-242-573-WNSY (4260)  https://988[employer]."[employer]       SUBSTANCE ABUSE:     [facility] (formerly known as the ABU)  [x] [phone], Option 2  [x] [address] [facility] 4th [facility], [place] [address]  [x] [email or web address]/[email or web address]-program  [x] The [facility] delivers comprehensive and collaborative treatment for alcohol and substance use disorders.  Excellent program for working professionals or anyone else seeking recovery.  [x] Requires insurance approval prior to starting program, call number above for more information.  [x] Intensive Outpatient Rehabilitation Program - M-F 9am-3pm - daily groups with psychologists and social workers, sessions with MDs 3x per week   [x] Ambulatory detox and dual diagnosis available      SUBOXONE:     NOTE: some Suboxone clinics require their clients to participate in a structured program (such as an IOP) in order to be prescribed Suboxone.  Some clinics have a long waiting list.  Most of these clinics do not accept walk-in clients, so call first to to learn what must be done to get started on Suboxone.    [facility] - [phone] (can do Sublocade)  [address] [facility], [place] [address]    [address]  [phone]    [facility] - [phone] (can do Sublocade)  [address] [place] [address]    [facility]  [address] [place]  [phone]     [facility] (very short waiting list, may accept some walk-in's but call first if possible)  [address], [place] [address]  [phone]; [phone]    [facility]   [address] [place]    [phone]    [facility] (can usually be seen within a week but is cash only  for appointment)  [address] [place], [place]    [facility] ([facility])  [address] [facility], [address] [place] [address] [phone]    [facility] ([facility])  [address], [place]  [phone]    [facility] ([facility])  [address] [facility] [address]  [phone]    [place], [place]:    [facility] - [address] [place] [address] - Tel: [phone]    Jose Padilla - [address] [place] [address] - Tel: [phone]    Arturo Gonzalez - [address] [place] [address] - Tel: [phone]    Natanael Fuentes - [address] [place] [address] - Tel: [phone]    Eyad Ramirez - [address] [place] [address] - Tel: [phone]    [place], [place]:     Dr. Sharifa Vergara and Dr. Enrique Borden - [address], [place] - Tel: [phone]    Dr. Kayleen De Los Santos - [address] [place], [place] - Tel: [phone]    Dr. Justin Mott - Tel: [phone]    Dr. Stephon Russ - [facility] - [phone]      METHADONE:     [facility] (the only methadone clinic in the [facility], has two locations)  [x] [place] [facility] [place] [address], [phone]  [x] [facility] [place] [address], [phone]    12 STEP PROGRAMS (and similar):     Alcoholics Anonymous (local)  [x] [phone]  [x] www.[place].org for schedules for in-person and online meetings  [x] There are AA meetings throughout the day all over town  [x] AA costs nothing to attend; they pass a basket for donations but this is not required    Narcotics Anonymous  [x] [phone]  [x] www.noana.org  [x] There are NA meetings throughout the day all over town  [x] NA costs nothing to attend; they pass a basket for donations but this is not required    Alcoholics Anonymous Online Intergroup (national)  [x] www.aa-intergroup.org  [x] Good resource for large, nation-wide meetings  [x] Can also attend smaller, local meetings in other cities  [x] Countless meetings  all day and all night  [x] AA costs nothing to attend; they pass a basket for donations but this is not required    Flying Sober - 24/7 zoom meetings for women and coed - sign on anytime, anywhere!  https://flyingNusym Technologysober.[employer]/51-8-uvwokanj/    Online Intergroup of AA - 121 Open AA [place] Meeting - 24/7 zoom meetings  https://aa-intergroup.org/meetings/    LOOKING FOR AN ALTERNATIVE TO 12 STEP PROGRAMS - check out:  SMART Recovery: https://www.smartrecovery.org/about-us  Yared Recovery: https://recoverydharma.org      DETOX UNITS (USUALLY 5-7 DAYS):     [facility]: [address], [place]  [phone], call first to ensure bed availability    [facility]: [address] [place]  [phone], Option 1, call first to ensure bed availability    [facility]: [facility] Andre [place], [facility]  [phone] (intake by appointment only)    [facility]: [address], [place]  [phone]      INTENSIVE OUTPATIENT PROGRAMS:     [facility] (formerly known as the ABU)  [x] [phone], Option 2  [x] [address] [facility] 4th [facility], [facility] [address]  [x] [email or web address]://www.[facility].[email or web address]/services/[email or web address]-program  [x] [facility] delivers comprehensive and collaborative treatment for alcohol and substance use disorders.  Excellent program for working professionals or anyone else seeking recovery.  [x] Requires insurance approval prior to starting program, call number above for more information.  [x] Intensive Outpatient Rehabilitation Program - M-F 9am-3pm - daily groups with psychologists and social workers, sessions with MDs 3x per week   [x] Ambulatory detox and dual diagnosis available    [facility] Int[facility]patient Program  [x] [phone]  [x] [address] [facility] (the clinic not on [facility] main [facility])  [x] Call number above for more info and to check insurance requirements    [address] [place] [address]  [phone]    [facility]:  [address] [facility], [address]?, [place], [place] [address]?, [phone]  [address] [facility]?, [place], [place] [address]?, [phone]    RESIDENTIAL REHABS (USUALLY 28 DAYS):     [facility]: [address], [phone]    [facility]: [address] [place]  [phone] (intake by appointment only)    [facility] (men only) [address], [phone]    [facility] (Female only) [address], [phone]    [facility]: [address], [place], men's program [phone], women's program [phone]    [facility]: [address] [place], [phone]    [facility]: [address], [place], [place], [phone]    [facility]: Men only, [phone], [address] [facility] Kiran Tao [facility]: [address], [place], [place], [phone]    [facility]: [address] [place] [address],  [phone]  New Location: [address] [place] [address] [address], [place] [address], [phone]    [facility]:   ?[address]?[place], [place] [address]?[phone]    [place]: [address], [place] [address], [phone]    [place]: [place], [phone]     [facility]: [place], [place], [phone]    [facility]: [place], [phone]    [facility]: [place], [place], [phone]    [place]: [place], [phone]    [facility]: [address], [place] [address], [phone]    COMMUNITY ADDICTION CLINICS:     [facility]: [address] [facility], [address] [place] -[phone] -or- [address] [place] [address]    [facility] [place]: [address] [facility] [address], [place]  [address]     [facility]: Clinics [phone]; Crisis [phone]    [facility]: [address] [place] [address]    [facility]: [address] [facility] [address]    [facility]: [address] [place] [address],    [facility]: 2nd Floor [address] [place] [address]    [facility]: [address], [place] [address]    [facility], [address] [place] [address]    [facility]: [address] [facility], [place] [phone]  (serves youth 16-23 years old)    [facility]: [facility] [phone]    [facility]: [address] [facility], [place] [phone]    [facility]: [address] [facility] [phone]    [facility]al [facility]: [address] [facility], [address], [phone]     [facility]: [address] [facility], [phone], [phone]    RESOURCES IN OTHER [facility]:     [facility]: [address] [facility], [phone], [phone]    [facility]: [address] [facility], [address], [phone]    [facility], [address] [place] [address], [phone]    [facility]: [address] [facility], [phone]    [facility], [address] [facility], [phone] ([facility])    [facility], [address] [facility], [phone] ([facility])    [facility] Health, [address], [place], [phone] ([facility], and [facility])    [facility] [facility], [address] [place], [phone] ([facility])    [facility] Hotline [phone], [phone]    [facility], [address] [facility], [facility], [address] [place] [address]  "Blvd[address], [facility], [address], [facility], [address] [facility] [address]., [place] [facility], [address], [place]    [facility], [address], [place] [phone]    [facility], [address] [facility] [phone]    [facility], [address] [facility] [phone]    [facility], [address] [facility], [phone]      Lithuanian SPEAKING (en español):     Información de la reunión de Alcohólicos Anónimos  Kel[facility], 10:00 am  Habla [facility]  Esta reunión está abierta y cualquiera puede asistir.    Sri Lankan speaking Alcoholics anonymous meetings:  El "Kelvin Hermann AA Skype" es un kelvin on line de Alcohólicos Anónimos en [facility]. El kelvin es vahid, gratuito y virtual a través de Skype Audio. El kelvin funciona mediante mireya llamada grupal de voz, por lo que no se utiliza la videollamada, ni se pueden coretta las imágenes o rostros de los participantes. Hace davy años y medio abrimos el primer Kelvin de AA por Skype en [facility], santosh actualmente asisten personas desde [place], [place], [place], [place], [place], [place], [place], [place], [place], [place], [place] y [place], entre otros.    El kelvin es muy útil para los alcohólicos que residen en lugares donde no se celebran reuniones de AA, o residen en lugares donde las reuniones de AA son un número limitado de días a la semana, o para aquellos compañeros que se hayan de viaje o que, por cualquier motivo, se hayan convalecientes y no pueden desplazarse. Todos los días nos reuniones a las 21:00 (hora española)    Podéis obtener más información sobre el kelvin y deidre sesiones en la página web [email or web address]      MENTAL HEALTH:     [facility] of Psychiatry - " Outpatient Clinic  [phone]    [facility] Psychiat[facility] - General Psychiatry Intensive Outpatient Program  [facility] (formerly known as the BMU)  [phone], option 3    [facility] - Dual Diagnosis Intensive Outpatient Program  [facility] (formerly known as the ABU)  [phone], option 2      Pending sale to [facility] MENTAL HEALTH CENTERS:     [facility]  (aka [facility], [facility])  Serves [facility].  Serves uninsured patients & those with Medicaid.  Main location: [address] [place] [id number] [phone]  Walk-in's available during regular business hours.  24/7 Crisis Line: [phone]    [facility]  (aka [facility])  Serves [facility].  Serves uninsured patients, those with Medicaid and some private plans.  Walk-in's available during regular business hours.  Primary care services available as well.  [facility]: [address] [place] [address];  [phone]  [place]: [address] [place] [address];  [phone]  24/7 Crisis Line: [phone]    [facility]  Serves uninsured patients & those with Medicaid, call for more info.  Primary care, pediatrics, HIV treatment, and dentistry services available as well.  Three locations.  [phone]    [facility] [place]Cor[facility]ate Office  Serves patients with Medicaid, Medicare, and private insurance  [address]  [place] [address]  [phone]    [facility]  Serves uninsured on a sliding scale, as well as Medicaid, Medicare, and private plans.  Eight locations around the [facility] area.  [phone]    [facility]  Serves uninsured patients & those with Medicaid, private insurances.  Primary care  available as well.  [phone]  [address] [place] [address]    [facility]  Serves veterans who were honorably discharged.  [address] [place] [address]  [phone]  24/7 Veterans Crisis Line: [phone] (Press 1)    If you have private insurance and need to find a specialist, please contact your insurance network to request a list of providers covered by your benefits.      MENTAL HEALTH/ADDICTIVE DISORDERS:     AA ([phone]), NA ([phone])   National Suicide Prevention Lifeline- Call 1-303.567.2783 Available 24 hours everyday  [facility] [phone]; Crisis Line 411-4666 - Call for options A-F:  Intensive Outpatient Treatment/ Day programs   [facility], please contact   [facility], please contact [phone] or [phone] to speak with an admissions counselor.  [facility] (Methadone Maintenance)   [address] [place] [address], [phone]  [address] [place] [address] [phone]  [facility], [address] [place] [address], [phone]  [facility] [phone]  [facility] [place] please contact [phone]  [facility], [address] [facility], 4th floor [place] [address] Phone: [phone]   Rip Boyce Office: [address] [place] [address], [phone]  [place] Office: [address] [facility], [address], [place] [address], [phone]  [place] Office: [address] [place] [place] [address] [phone]    Outpatient Substance Abuse Treatment   [facility] (Methadone Maintenance)   [address] [place] [address], [phone]  [address] [place] [address] [phone]  [facility], [address] [place] [address], [phone]  [facility]  [place]va Office: [address], [place] [address], [phone]  [place]  Office: [address] [facility], [address], [place] [address], [phone]  [place] Office: [address] [facility] [address] [facility], [place] [address] [phone]  [facility], [address] [place] [address] [phone]   [facility], [address] [facility], [address], [phone]  [facility], [address] [place], [place] [phone]    Residential Substance Abuse Treatment   [facility] 1[address] [facility], [phone] or [phone]  [facility], 4150 [facility], [phone]  [facility] (men only) [address] [place] [address], [phone]  [facility] at the [facility] (women only) [address] [place] [address] [phone]  [facility], [address] [place] [address] [phone]  [facility] (women only), intakes at 4150 [facility], [phone]  [facility] (7-day program, $100, [address], [phone], [phone], [phone])  [facility] (Men only, [phone]), [address], [place] (Vets*/Non-Vets)  [facility] (Men only, $400/month program fee) [address] [facility], [phone]  [facility] (Women over age 39 only), [address] [facility], [phone]    Out of Area:    [place], [address] Critical access hospital [address], [place], [place] ([phone])  [facility] Program (men only), [address] [facility] [place] [address], [phone]  [facility], [address] [place], [place] ([phone])  [place], [address] [place] [address] Dr. Jensen, [place] ([phone])  [facility], [address] [place] [address], [phone]  [facility] (Women only, has to have mental illness, can be homeless or substance abuser), [phone]        DOMESTIC VIOLENCE RESOURCES:     Advocacy  [facility] ([facility])  [address] [address]    [facility] ? [phone]  Services  provided: emergency shelter, individual advocacy, information and referrals, group support, children's program, medical advocacy, forensic medical exams, primary care, legal assistance, counseling, safety planning, and caregiver support    [facility] HEALING AND EMPOWERMENT [place]  Confidential location  [facility] ? [phone]  Services provided: short term emergency shelter, all services provided are free of charge    [facility] FOR COMMUNITY ADVOCACY  Multiple locations in [facility], [place], and [facility] ([place], and [place])    [facility] ? [phone]  Services provided: emergency shelter, individual advocacy, information and referrals, group support, children's program, medical advocacy, legal assistance in obtaining restraining orders, counseling, safety planning, and caregiver support    [facility]   Emergency Shelter   [phone]  Emergency Services ,Legal and Financial Assistance Services ,Housing Services ,Support Services     [facility]   [phone]  Emergency Services ,Counseling Services , Housing Services ,Support Services ,Children's Services     [facility]OMEN [facility] A [facility]ISION  [address] [place] [address]  WWAV ? [phone]  Services provided: advocacy, health education and supportive services, specializing in free healing services for marginalized groups, including LGBTQ individuals and sex workers    SEXUAL TRAUMA AWARENESS AND RESPONSE (STAR)  [address] [place] [address]    STAR ? [phone]  Services provided: individual advocacy, information and referrals, group support, medical advocacy, legal assistance, counseling, and safety planning for survivors of sexual assault    [facility] ([employer])  [address] [place] [address]  [employer] Forensic Program ? [phone]  Services provided: free forensic medical exams for sexual assault and  domestic violence, which can be performed up to 5 days after an incident. It is not necessary to make a police report to receive a forensic medical exam    Legal  [facility]  [address], [place] [address]  [facility] ? [phone]  Services provided: free emergency legal representation for survivors of doemstic violence residing in [facility]. Legal services may include temporary restraining orders, temporary child support, custody, and use of property    [facility] LEGAL SERVICES (SLLS)  [address], [place] [address]  SLLS ? [phone]  Services provided: free legal representation for survivors of domestic violence residing in [facility]. Legal services may include temporary child support, custody, and divorce      HOTLINES:     [facility] DOMESTIC VIOLENCE HOTLINE  [phone]    Services provided: free and confidential hotline for victims and survivors of domestic violence. All calls will be routed to a domestic violence service provided in the victim or survivor's area    NATIONAL HUMAN TRAFFICKING HOTLINE  ([phone]    Services provided: national anti-trafficking hotline serving victims and survivors of human trafficking. Provides information about local resources, and access to safe space to report tips, seek services, and ask for help    VIA LINK  211 or (002) 235-3966    Service provided: counselors can provide crisis counseling. Counselors can also provide information and referrals to programs which can help with needs such as food, shelter, medical care, financial assistance, mental health services, substance abuse treatment, senior services, childcare, and more      HOMELESS SHELTERS:      Homeless shelters  [facility]  Emergency shelter for individuals and families  [address]  [phone]  [facility]  Emergency shelter for men only  Meals daily 6am, 2pm, & 6pm  Clothing, case management M-F by appointment  (ID/job/housing/legal assistance), mail  [address] [facility]  [phone]  [place]  Emergency shelter for men  [address] [facility]  [phone]  Emergency shelter for women  [address] [facility]  [phone]  Breakfast & lunch daily, dinner M-F  Case management, job counseling services   [facility]  Emergency shelter for teens and young adults up to 20yo  [address]  [phone]  [place] [facility]  Emergency shelter for women over 16yo and their kids  [address] [place] [address]  [phone]  [facility]  Day program, meals M-F 1PM (arrive early)  Showers, laundry, hygiene kits, showers, phones, , notary services, case management, ID assistance  [address] [facility]  [phone] M-F 8am-2:30pm  [employer] [facility]  Day program  [facility] 7:30am-3:30pm,  8:30am-3:30pm  Crisis intervention, employment assistance, food/clothing, hygiene kits, bus tokens, mail  [address] [facility] [address]  [phone]  [facility]  Mobile outreach for homeless persons in [facility]  [phone]  [facility]  Primary healthcare, case management, dental services, TB placement  Call ahead  [address] [facility] 2nd [address]  [phone]  [facility]  Connects homeless people with their loved ones in other cities by providing transportation costs   [phone]      [place] RESOURCES:     [place] Mobile Mental Health Crisis Response Team:    Region 12 ([place], [place], [place], and [facility]) ([facility] and [facility])  [phone]      Outpatient Mental Health & Addiction Clinic Resources for both [facility] and [facility]:    [facility] Mental Healthcare Resources  Website: www.pbmhr.org  Main Number: 203-964-5956    [facility] ([facility])  [address] ([address] [facility]) [place] [address]  [phone]    [facility] ([place]  Area)  [address] ([address] [facility]) [place] [address]  [phone]    [facility]  [address] ([address]) [place] [address]  [phone]    [facility]  [address] ([address] [facility]) [place] [address]  [phone]      Addiction Treatment Resources for both [facility] and [facility]:    [facility] (Detox, Residential, PHP, IOP, and Aftercare Programs)  [address] [place] [address]  [phone]    [facility] (Residential, IOP, Transitional Living, and Aftercare Programs)  [address] [place] [address]  [phone]    [facility] (Inpatient, Residential, Detox, IOP, Outpatient, and Aftercare Programs)  [address] [place] [address]  [phone] or toll free at [phone]      Outpatient Mental Health Psychotherapy Resources for both [facility] and [facility]:    Gayathri Celaya, [facility]  [address]  [place] [address]  [phone]  Specialties: Depression, Anxiety, and Life Transitions    Carmen Gama, PhD  [address]  [place] [address]  [phone]  Specialties: Testing and Evaluation, Education and Learning Disabilities, and ADHD    Coral Capps, [facility] [address] [place] [address]  [phone]  Specialties: Obsessive-Compulsive (OCD), Depression, and Relationship Issues    Deborah Hou LPC [address] [place] [address]  [phone]  Specialties: Trauma & PTSD, Mood Disorders, and Anxiety    Deborah Ramon, PhD, [facility] [address] [facility] [place] [address]  [phone]  Specialties: Family Conflict, Child, and Relationship Issues    Samia Walters LPC [facility] [place] [address] [phone]  Specialties: Anxiety, Depression, and  Anger Management        IN CASE OF SUICIDAL THINKING, call the National Suicide Hotline Number: 988    988 Suicide & Crisis Lifeline: 988 , 9-5676-439-498-TALK (9399)  Provides 24/7, free and confidential support for people in distress, prevention and crisis resources for you or your loved ones, and best practices for professionals.    Call, text or chat.  https://988Zakaz.ua.org

## 2023-12-15 NOTE — PLAN OF CARE
Problem: Diabetic Ketoacidosis  Goal: Fluid and Electrolyte Balance with Absence of Ketosis  Outcome: Ongoing, Progressing     Problem: Adult Inpatient Plan of Care  Goal: Plan of Care Review  Outcome: Ongoing, Progressing  Goal: Patient-Specific Goal (Individualized)  Outcome: Ongoing, Progressing  Goal: Absence of Hospital-Acquired Illness or Injury  Outcome: Ongoing, Progressing  Goal: Optimal Comfort and Wellbeing  Outcome: Ongoing, Progressing  Goal: Readiness for Transition of Care  Outcome: Ongoing, Progressing     Problem: Infection  Goal: Absence of Infection Signs and Symptoms  Outcome: Ongoing, Progressing     Problem: Diabetes Comorbidity  Goal: Blood Glucose Level Within Targeted Range  Outcome: Ongoing, Progressing     Problem: Adjustment to Illness (Sepsis/Septic Shock)  Goal: Optimal Coping  Outcome: Ongoing, Progressing     Problem: Bleeding (Sepsis/Septic Shock)  Goal: Absence of Bleeding  Outcome: Ongoing, Progressing     Problem: Glycemic Control Impaired (Sepsis/Septic Shock)  Goal: Blood Glucose Level Within Desired Range  Outcome: Ongoing, Progressing     Problem: Infection Progression (Sepsis/Septic Shock)  Goal: Absence of Infection Signs and Symptoms  Outcome: Ongoing, Progressing     Problem: Nutrition Impaired (Sepsis/Septic Shock)  Goal: Optimal Nutrition Intake  Outcome: Ongoing, Progressing     Problem: Fluid and Electrolyte Imbalance (Acute Kidney Injury/Impairment)  Goal: Fluid and Electrolyte Balance  Outcome: Ongoing, Progressing     Problem: Oral Intake Inadequate (Acute Kidney Injury/Impairment)  Goal: Optimal Nutrition Intake  Outcome: Ongoing, Progressing     Problem: Renal Function Impairment (Acute Kidney Injury/Impairment)  Goal: Effective Renal Function  Outcome: Ongoing, Progressing     Problem: Skin Injury Risk Increased  Goal: Skin Health and Integrity  Outcome: Ongoing, Progressing    Pt remained free of falls or injuries during shift. Pt remains in bed with the call  light in reach and the bed alarm on.

## 2023-12-15 NOTE — PLAN OF CARE
Pt Aaox4, c/o of mid abdominal pain. Pt is running NSR-ST on telemetry. VSS, no acute distress noted. Call light within reach. CT of abdomen pending in am. POC on going.    Problem: Adult Inpatient Plan of Care  Goal: Plan of Care Review  Outcome: Ongoing, Progressing  Goal: Absence of Hospital-Acquired Illness or Injury  Outcome: Ongoing, Progressing  Goal: Optimal Comfort and Wellbeing  Outcome: Ongoing, Progressing  Goal: Readiness for Transition of Care  Outcome: Ongoing, Progressing     Problem: Diabetes Comorbidity  Goal: Blood Glucose Level Within Targeted Range  Outcome: Ongoing, Progressing     Problem: Adjustment to Illness (Sepsis/Septic Shock)  Goal: Optimal Coping  Outcome: Ongoing, Progressing     Problem: Glycemic Control Impaired (Sepsis/Septic Shock)  Goal: Blood Glucose Level Within Desired Range  Outcome: Ongoing, Progressing     Problem: Skin Injury Risk Increased  Goal: Skin Health and Integrity  Outcome: Ongoing, Progressing

## 2023-12-15 NOTE — PROGRESS NOTES
[facility] - Telemetry [facility] Medicine  Progress Note    Patient Name: Wilfrido Elias  MRN: [id number]  Patient Class: IP- Inpatient   Admission Date: 12/12/2023  Length of Stay: 3 days  Attending Physician: Chris Mcdaniel MD  Primary Care Provider: Allison, Primary Doctor        Subjective:     Principal Problem:Severe sepsis        HPI:  This is a 36 year old lady with history of diabetes who presented to the ED for altered mental status. History obtained primarily from chart review. Per EMS pt was 98% on room air with wheezing. Duoneb given enroute with improvement. Per EMS pt CBG came back as greater than 600 and is 913 on admission. Pt is unconscious and non verbal in the ED, she is not answering questions and is unrousable. Per ED note her family was concerned because she seemed a bit out of it. Per chart review she has not been taking her insulin lately.     In the ED, patietn was tachycardia, tachypnea, elevated WBC, and elevated lactic in the setting of DKA. Unclear source of infection as UA and CXR were unremarkable. Patient was admitted to ICU for further management.             Overview/Hospital Course:    Patient was admitted to ICU for acute encephalopathy, DKA, and severe sepsis . BG came down with insulin gtt and gap closed. Patient was mentating better. mentating better.       12/14 Transfer to hospital medicine  started on tylenol 1000mg q 8h for abdominal pain. Maalox for heartburn. Leucocytosis and ANNA resolved. P replaced . Endocrine consulted. c/o heart burn and 8/10 abdominal pain  no relief with maalox. likely gastroparesis.  X ray abd - No significant intra-abdominal abnormality. lipase. started protonix and oxycodone PRN . CT abdomen pelvis ordered   12/15 poor oral intake. insulin dose decreased. glucose 300s.sodium trending down 131 . urine lytes . was on adderall 30 mg bid, stopped 2 months back as her psychiatrist not available for prescription.  since then smoking meth  twice daily. psychiatry consulted for Methamphetamine use/ ADHD.  Nicotine patch for smoking. counseled regarding cessation         Review of Systems:   Pain scale:  Constitutional:  fever,  chills, headache, vision loss, hearing loss, weight loss, Generalized weakness, falls, loss of smell, loss of taste, poor appetite,  sore throat  Respiratory: cough, shortness of breath.   Cardiovascular: chest pain, dizziness, palpitations, orthopnea, swelling of feet, syncope  Gastrointestinal: nausea, vomiting, abdominal pain, diarrhea, black stool,  blood in stool, change in bowel habits, constipation  Genitourinary: hematuria, dysuria, urgency, frequency  Integument/Breast: rash,  pruritis  Hematologic/Lymphatic: easy bruising, lymphadenopathy  Musculoskeletal: arthralgias , myalgias, back pain, neck pain, knee pain  Neurological: confusion, seizures, tremors, slurred speech  Behavioral/Psych:  depression, anxiety, auditory or visual hallucinations     OBJECTIVE:     Physical Exam:  Body mass index is 18.5 kg/m².    Constitutional: Appears  thin built   Head: Normocephalic and atraumatic.   Neck: Normal range of motion. Neck supple.   Cardiovascular: Normal heart rate.  Regular heart rhythm.  Pulmonary/Chest: Effort normal.   Abdominal: No distension.  + epigastric tenderness  Musculoskeletal: Normal range of motion. No edema.   Neurological: Alert and oriented to person, place, and time.   Skin: Skin is warm and dry.   Psychiatric: Normal mood and affect. Behavior is normal.                  Vital Signs  Temp: 99.1 °F (37.3 °C) (12/15/23 1115)  Pulse: 98 (12/15/23 1115)  Resp: 18 (12/15/23 1115)  BP: 138/87 (12/15/23 1115)  SpO2: (Abnormal) 94 % (12/15/23 1115)     24 Hour VS Range    Temp:  [97.8 °F (36.6 °C)-99.2 °F (37.3 °C)]   Pulse:  []   Resp:  [15-18]   BP: (138-168)/()   SpO2:  [94 %-100 %]   No intake or output data in the 24 hours ending 12/15/23 1352        I/O This Shift:  No intake/output data  "recorded.    Wt Readings from Last 3 Encounters:   12/12/23 52 kg (114 lb 10.2 oz)       I have personally reviewed the vitals and recorded Intake/Output     Laboratory/Diagnostic Data:    CBC/Anemia Labs: Coags:    Recent Labs   Lab 12/13/23 0322 12/14/23  0612 12/15/23  0345   WBC 17.17* 8.27 4.36   HGB 8.5* 9.6* 9.6*   HCT 24.9* 27.5* 29.1*    260 244   MCV 72* 70* 75*   RDW 15.4* 15.7* 15.5*   IRON 71  --   --    FERRITIN 32  --   --     No results for input(s): "PT", "INR", "APTT" in the last 168 hours.     Chemistries: ABG:   Recent Labs   Lab 12/13/23  0322 12/13/23  0820 12/13/23  1751 12/14/23  0612 12/15/23  0345   *   < > 138 137 131*   K 4.2   < > 4.0 3.9 4.5      < > 103 100 97   CO2 20*   < > 25 28 27   BUN 37*   < > 20 13 13   CREATININE 1.3   < > 1.0 0.7 0.7   CALCIUM 8.3*   < > 9.1 9.0 9.1   PROT 6.1  --   --  6.4 6.2   BILITOT 0.3  --   --  0.3 0.4   ALKPHOS 182*  --   --  178* 174*   ALT 34  --   --  31 26   AST 16  --   --  34 32   MG 2.1  --   --  2.2 2.1   PHOS 2.7  --   --  2.4* 2.9    < > = values in this interval not displayed.    Recent Labs   Lab 12/12/23  0717   PH 7.119*   PCO2 21.1*   PO2 42   HCO3 6.8*   POCSATURATED 63   BE -23*        POCT Glucose: HbA1c:    Recent Labs   Lab 12/14/23  0330 12/14/23  1153 12/14/23  1655 12/14/23  2119 12/15/23  0204 12/15/23  0754   POCTGLUCOSE 101 370* 237* 110 181* 281*    Hemoglobin A1C   Date Value Ref Range Status   12/12/2023 13.6 (H) 4.0 - 5.6 % Final     Comment:     ADA Screening Guidelines:  5.7-6.4%  Consistent with prediabetes  >or=6.5%  Consistent with diabetes    High levels of fetal hemoglobin interfere with the HbA1C  assay. Heterozygous hemoglobin variants (HbS, HgC, etc)do  not significantly interfere with this assay.   However, presence of multiple variants may affect accuracy.          Cardiac Enzymes: Ejection Fractions:    Recent Labs     12/14/23  1029   TROPONINI 0.014    No results found for: "EF"       No " "results for input(s): "COLORU", "APPEARANCEUA", "PHUR", "SPECGRAV", "PROTEINUA", "GLUCUA", "KETONESU", "BILIRUBINUA", "OCCULTUA", "NITRITE", "UROBILINOGEN", "LEUKOCYTESUR", "RBCUA", "WBCUA", "BACTERIA", "SQUAMEPITHEL", "HYALINECASTS" in the last 48 hours.    Invalid input(s): "WRIGHTSUR"      Lactate (Lactic Acid) (mmol/L)   Date Value   12/13/2023 0.9   12/12/2023 4.0 (HH)     No results found for: "BNP"  No results found for: "CRP", "SEDRATE"  No results found for: "DDIMER"  Ferritin (ng/mL)   Date Value   12/13/2023 32     No results found for: "LDH"  Troponin I (ng/mL)   Date Value   12/14/2023 0.014     CPK (U/L)   Date Value   12/12/2023 80     No results found for this or any previous visit.  SARS-CoV2 (COVID-19) Qualitative PCR (no units)   Date Value   12/12/2023 Not Detected       Microbiology labs for the last week  Microbiology Results (last 7 days)       Procedure Component Value Units Date/Time    Blood Culture #1 **CANNOT BE ORDERED STAT** [[id number]] Collected: 12/12/23 0810    Order Status: Completed Specimen: Blood from Peripheral, Antecubital, Left Updated: 12/15/23 1013     Blood Culture, Routine No Growth to date      No Growth to date      No Growth to date      No Growth to date    Blood Culture #2 **CANNOT BE ORDERED STAT** [[id number]] Collected: 12/12/23 0810    Order Status: Completed Specimen: Blood from Peripheral, Antecubital, Right Updated: 12/15/23 1013     Blood Culture, Routine No Growth to date      No Growth to date      No Growth to date      No Growth to date            Reviewed and noted in plan where applicable- Please see chart for full lab data.    Lines/Drains:       Peripheral IV - Single Lumen 12/12/23 1152 18 G Right Antecubital (Active)   Site Assessment Clean;Dry;Intact 12/14/23 0345   Extremity Assessment Distal to IV No abnormal discoloration 12/13/23 2000   Line Status Flushed 12/13/23 2000   Dressing Status Clean;Dry;Intact 12/13/23 2000   Dressing Intervention " Integrity maintained 12/13/23 2000   Dressing Change Due 12/16/23 12/13/23 2000   Site Change Due 12/16/23 12/13/23 2000   Reason Not Rotated Not due 12/13/23 2000   Number of days: 1            Peripheral IV - Single Lumen 12/12/23 1901 20 G Left Wrist (Active)   Site Assessment Clean;Dry;Intact 12/14/23 0346   Extremity Assessment Distal to IV No abnormal discoloration 12/13/23 2000   Line Status Flushed 12/13/23 2000   Dressing Status Dry;Clean;Intact 12/13/23 2000   Dressing Intervention Integrity maintained 12/13/23 2000   Dressing Change Due 12/16/23 12/13/23 2000   Site Change Due 12/16/23 12/13/23 2000   Reason Not Rotated Not due 12/13/23 2000   Number of days: 1       Imaging  ECG Results              EKG 12-lead (Final result)  Result time 12/12/23 10:43:46      Final result by Interface, Lab In [facility] (12/12/23 10:43:46)               Narrative:    Test Reason : R73.9,    Vent. Rate : 113 BPM     Atrial Rate : 113 BPM     P-R Int : 162 ms          QRS Dur : 088 ms      QT Int : 350 ms       P-R-T Axes : 074 090 039 degrees     QTc Int : 480 ms    Probably  Sinus tachycardia  Possible Left atrial enlargement  Rightward axis  Possible  Anteroseptal infarct ,age undetermined  Abnormal ECG  No previous ECGs available  Confirmed by Venkat AGUILAR MD ([id number]) on 12/12/2023 10:43:36 AM    Referred By: [email or web address]   SELF           Confirmed By:Venkat AGUILAR MD                                  No results found for this or any previous visit.      CT Abdomen Pelvis  Without Contrast  Narrative: EXAMINATION:  CT ABDOMEN PELVIS WITHOUT CONTRAST    CLINICAL HISTORY:  abd pain;    TECHNIQUE:  Low dose axial images, sagittal and coronal reformations were obtained from the lung bases to the pubic symphysis.  Oral contrast was not administered.    COMPARISON:  Retroperitoneal ultrasound 12/13/2023, abdominal x-ray 12/14/2023    FINDINGS:  SOFT TISSUES: Small foci of subcutaneous emphysema in the lower abdominal wall, likely  injection related.    MEDIASTINUM: Heart is normal size. No pericardial effusion.    LUNG BASES: Unremarkable.    HEPATOBILIARY: Liver is enlarged measuring 19.5 cm.  No focal hepatic lesions. No biliary ductal dilatation. Normal gallbladder.    PANCREAS: No focal masses or ductal dilatation.    SPLEEN: Normal size.    ADRENALS: No adrenal nodules.    KIDNEYS/URETERS: Kidneys are normal size and location.  No nephrolithiasis or hydronephrosis. No solid mass lesions. Ureters are unremarkable.    BLADDER/PELVIC ORGANS: Unremarkable.    PERITONEUM / RETROPERITONEUM: No free air or fluid.    LYMPH NODES: No lymphadenopathy.    VESSELS: Unremarkable.    GI TRACT: Stomach and duodenum are unremarkable.  No evidence of bowel obstruction or inflammation.  Appendix is not visualized and there is a suture line in the right lower quadrant, suggesting appendectomy.    BONES: No acute fractures or suspicious osseous lesions.  Impression: Hepatomegaly.    No other significant abnormality.    Electronically signed by resident: Ivan Shanks  Date:    12/15/2023  Time:    09:08    Electronically signed by: Bruno Townsend  Date:    12/15/2023  Time:    11:02      Labs, Imaging, EKG and Diagnostic results from 12/15/2023 were reviewed.    Medications:  Medication list was reviewed and changes noted under Assessment/Plan.  No current facility-administered medications on file prior to encounter.     No current outpatient medications on file prior to encounter.     Scheduled Medications:  acetaminophen, 1,000 mg, Oral, Q8H  aluminum-magnesium hydroxide-simethicone, 30 mL, Oral, QID (AC & HS)  doxycycline, 100 mg, Oral, Q12H  enoxparin, 30 mg, Subcutaneous, Q24H (prophylaxis, 1700)  insulin aspart U-100, 4 Units, Subcutaneous, TIDWM  insulin detemir U-100, 4 Units, Subcutaneous, QHS  [START ON 12/16/2023] insulin detemir U-100, 6 Units, Subcutaneous, BID  lactated ringers, 1,000 mL, Intravenous, Once  mirtazapine, 15 mg, Oral,  Nightly  nicotine, 1 patch, Transdermal, Daily  pantoprazole, 40 mg, Oral, Daily      PRN: dextrose 10%, dextrose 10%, glucagon (human recombinant), glucose, glucose, insulin aspart U-100, ondansetron, oxyCODONE, senna-docusate 8.6-50 mg, sodium chloride 0.9%, sodium chloride 0.9%, sodium chloride 0.9%  Infusions:       Estimated Creatinine Clearance: 88.6 mL/min (based on SCr of 0.7 mg/dL).             Assessment/Plan:      * Severe sepsis    36 yoF with unknown past medical history presented with tachycardia, tachypnea, elevated WBC, and elevated lactic in the setting of DKA. Unclear source of infection as UA and CXR were unremarkable. History was minimal as patient is encephalopathic. Likely source is multiple skin infections noted on patient.      - d/c vanc and zosyn and start doxycycline  - f/u Bcx and tailor antibiotics    12/14 BCX 2 NGTD. on doxycycline         Methamphetamine abuse  12/15 . psychiatry consulted for Methamphetamine use/ ADHD       Hyponatremia  sodium trending down 131 . urine lytes .likely poor oral intake   Recent Labs   Lab 12/15/23  0345   *       Diabetes mellitus with hyperglycemia        Acute metabolic encephalopathy    Likely 2/2 to sepsis or DKA.      - continue monitoring    12/14 U tox qwith amphetamines. TSH WNL. addiction psychiatry eval        Lactic acidosis  resolved     High anion gap metabolic acidosis    Likely 2/2 DKA vs lactic acidosis. resolved        ANNA (acute kidney injury)    Cr 2.0. likely prerenal ANNA in the setting of dehydration and DKA.      - IVF and resume diet  - trend Cr  - avoid nephrotoxic medication and renally dose   resolved    Recent Labs   Lab 12/13/23  1155 12/13/23  1751 12/14/23  0612   BUN 25* 20 13   CREATININE 1.1 1.0 0.7       I & O     Intake/Output Summary (Last 24 hours) at 12/14/2023 0825  Last data filed at 12/13/2023 1800  Gross per 24 hour   Intake 456.36 ml   Output 1800 ml   Net -1343.64 ml        Microcytic anemia    Hgb of  10 and MCV of 77. No evidence of bleeding     - trend Hgb and transfuse <7       Recent Labs   Lab 12/12/23  0709 12/13/23  0322 12/14/23  0612   HGB 10.8* 8.5* 9.6*         Component Value Date/Time    MCV 70 (L) 12/14/2023 0612    RDW 15.7 (H) 12/14/2023 0612    IRON 71 12/13/2023 0322    FERRITIN 32 12/13/2023 0322     Monitor CBC and transfuse if H/H drops below 7/21.      DKA (diabetic ketoacidosis)    BG >500 on multiple POCT glucose. BHB elevated. A1c of 13.6. repeat labs showed closed gap and acidosis mostly resolved.      - patient transitioned to basal bolus insulin  - BMP q4  - replete K as needed  - nutrition consulted    Patient's FSGs are controlled on current medication regimen.  Last A1c reviewed-   Lab Results   Component Value Date    HGBA1C 13.6 (H) 12/12/2023     Most recent fingerstick glucose reviewed-   Recent Labs   Lab 12/13/23  2330 12/14/23  0023 12/14/23  0115 12/14/23  0330   POCTGLUCOSE 64* 62* 117* 101       Antihyperglycemics (From admission, onward)      Start     Stop Route Frequency Ordered    12/14/23 1130  insulin aspart U-100 pen 4 Units         -- SubQ 3 times daily with meals 12/14/23 0919    12/14/23 1030  insulin detemir U-100 (Levemir) pen 10 Units         -- SubQ Daily 12/14/23 0919    12/14/23 1017  insulin aspart U-100 pen 0-10 Units         -- SubQ Before meals & nightly PRN 12/14/23 0919    12/12/23 1245  insulin regular in 0.9 % NaCl 100 unit/100 mL (1 unit/mL) infusion        Question Answer Comment   Initial dose (DO NOT CHANGE): 0.1 units/kg/hr    Insulin Rate Adjustment (DO NOT MODIFY ANSWER) [email or web address] [email or web address] DKA version [email or web address]        12/13/23 0130 IV Continuous 12/12/23 1235          12/14 Endocrine consulted       VTE Risk Mitigation (From admission, onward)           Ordered     enoxaparin injection 30 mg  Every 24 hours         12/14/23 1246     IP VTE HIGH RISK PATIENT  Once         12/12/23 1231                     Discharge Planning   OUMOU: 12/16/2023     Code Status: Full Code   Is the patient medically ready for discharge?: (No Documentation)    Reason for patient still in hospital (select all that apply): Treatment  Discharge Plan A: Home with family                  Chris Mcdaniel MD  Department of Hospital Medicine   [facility] - Telemetry Stepdown

## 2023-12-15 NOTE — ASSESSMENT & PLAN NOTE
12/15 was on adderall 30 mg bid, stopped 2 months back as her psychiatrist not available for prescription.  since then smoking meth twice daily. psychiatry consulted for Methamphetamine use/ ADHD.  Nicotine patch for smoking. counseled regarding cessation  psychiatry consulted for Methamphetamine use/ ADHD. needs addiction rehab program after discharge

## 2023-12-15 NOTE — NURSING
Notified Violeta PHAM that pt is c/o heartburn that has been unrelieved by antacids and that pt's BP is elevated. Also notified Violeta PHAM that pt's BG is 101 and that the pt states that she will not eat breakfast. Violeta PHAM stated to hold pt's insulin aspart and that he would decrease pt's dose of levemir. Violeta PHAM also stated that he would order maalox. Pt in bed with the call light in reach and the bed alarm on.

## 2023-12-15 NOTE — PROGRESS NOTES
"[facility] - Tele[facility] Stepdown  Endocrinology  Progress Note    Admit Date: 2023     Reason for Consult: Management of T1DM vd T2DM, Hyperglycemia       Diabetes diagnosis year: > 5 years ago    Home Diabetes Medications:  Tandem T slim    How often checking glucose at home? >4 x day Dexcom  BG readings on regimen: 100s-300s  Hypoglycemia on the regimen?  No  Missed doses on regimen?  Yes reports forgot to put it back on.    Diabetes Complications include:     Hyperglycemia    Complicating diabetes co morbidities:   Denies      HPI:   Patient is a 36 y.o. female with diabetes who presented to the ED for altered mental status.  In the ED, patient was tachycardia, tachypnea, elevated WBC, and elevated lactic in the setting of DKA. Unclear source of infection as UA and CXR were unremarkable. Patient was admitted to ICU for further management. DKA resolved on insulin drip.  Endo consulted for bg management.  Pt reports that she typically wears tandem pump but forgot to put it back on.  She could not remember how long it had been off.          Interval HPI:   No acute events overnight. Patient in room 8084/8084 A. Blood glucose variable. BG at, above, and below goal on current insulin regimen (SSI, prandial, and basal insulin ). Steroid use- None .      Renal function- Normal   Vasopressors-  None     Diet diabetic 1500 Calorie; Standard Tray     Eatin%  Nausea: No  Hypoglycemia and intervention: No  Fever: No  TPN and/or TF: No    BP (!) 152/91 (BP Location: Left arm, Patient Position: Lying)   Pulse 98   Temp 99.1 °F (37.3 °C) (Oral)   Resp 18   Ht 5' 6" (1.676 m)   Wt 52 kg (114 lb 10.2 oz)   SpO2 98%   BMI 18.50 kg/m²     Labs Reviewed and Include    Recent Labs   Lab 12/15/23  0345   *   CALCIUM 9.1   ALBUMIN 2.7*   PROT 6.2   *   K 4.5   CO2 27   CL 97   BUN 13   CREATININE 0.7   ALKPHOS 174*   ALT 26   AST 32   BILITOT 0.4     Lab Results   Component Value Date    WBC 4.36 " "12/15/2023    HGB 9.6 (L) 12/15/2023    HCT 29.1 (L) 12/15/2023    MCV 75 (L) 12/15/2023     12/15/2023     Recent Labs   Lab 12/13/23  0322   TSH 0.428     Lab Results   Component Value Date    HGBA1C 13.6 (H) 12/12/2023       Nutritional status:   Body mass index is 18.5 kg/m².  Lab Results   Component Value Date    ALBUMIN 2.7 (L) 12/15/2023    ALBUMIN 2.7 (L) 12/14/2023    ALBUMIN 2.7 (L) 12/13/2023     No results found for: "PREALBUMIN"    Estimated Creatinine Clearance: 88.6 mL/min (based on SCr of 0.7 mg/dL).    Accu-Checks  Recent Labs     12/13/23  1827 12/13/23 2011 12/13/23  2330 12/14/23  0023 12/14/23  0115 12/14/23  0330 12/14/23  1153 12/14/23  1655 12/14/23  2119 12/15/23  0204   POCTGLUCOSE 114* 86 64* 62* 117* 101 370* 237* 110 181*       Current Medications and/or Treatments Impacting Glycemic Control  Immunotherapy:    Immunosuppressants       None          Steroids:   Hormones (From admission, onward)      None          Pressors:    Autonomic Drugs (From admission, onward)      None          Hyperglycemia/Diabetes Medications:   Antihyperglycemics (From admission, onward)      Start     Stop Route Frequency Ordered    12/14/23 1130  insulin aspart U-100 pen 4 Units         -- SubQ 3 times daily with meals 12/14/23 0919    12/14/23 1030  insulin detemir U-100 (Levemir) pen 10 Units         -- SubQ Daily 12/14/23 0919    12/14/23 1017  insulin aspart U-100 pen 0-10 Units         -- SubQ Before meals & nightly PRN 12/14/23 0919    12/12/23 1245  insulin regular in 0.9 % NaCl 100 unit/100 mL (1 unit/mL) infusion        Question Answer Comment   Initial dose (DO NOT CHANGE): 0.1 units/kg/hr    Insulin Rate Adjustment (DO NOT MODIFY ANSWER) [email or web address] ADJUSTMENT DKA version [email or web address]        12/13/23 0130 IV Continuous 12/12/23 1235            ASSESSMENT and PLAN    Renal/  ANNA (acute kidney injury)  Titrate insulin slowly to avoid " hypoglycemia as the risk of hypoglycemia increases with decreased creatinine clearance.        ID  * Severe sepsis  Managed per primary team      Endocrine  Diabetes mellitus with hyperglycemia  BG goal: 140-180   Phenotypically  and presentation with DKA more consistent with T1DM. Pt not cooperative during interview as she reports she has told everyone, everything already. On insulin tandem pump at home, she reports taking it off but unclear when.  Comes in with DKA which has since resolved.   BG improving, but elevating overnight.  Likely will need adjustment to her basal to BID.  She is working to get her cousin to deliver tandem pump and dexcom.    - Adjust to Levemir 6 units BID tomorrow.  Give additional dose of 4 units Levemir tonight.  - Continue Novolog 4 units AC,   - Continue Novolog Moderate dose correction with ISF 25 starting at 150    - POCT Glucose before meals, at bedtime and at 2 am  - Hypoglycemia protocol in place  - Requested that pt bring her Tandem pump in with a Dexcom G6.      ** Please notify Endocrine for any change and/or advance in diet**  ** Please call Endocrine for any BG related issues **     Discharge Planning:   TBD. Please notify endocrinology prior to discharge.            Jose Bradley PA-C  Endocrinology  [facility]

## 2023-12-16 VITALS
BODY MASS INDEX: 18.42 KG/M2 | RESPIRATION RATE: 17 BRPM | HEART RATE: 106 BPM | SYSTOLIC BLOOD PRESSURE: 128 MMHG | HEIGHT: 66 IN | DIASTOLIC BLOOD PRESSURE: 79 MMHG | WEIGHT: 114.63 LBS | TEMPERATURE: 98 F | OXYGEN SATURATION: 100 %

## 2023-12-16 PROBLEM — E11.65 DIABETES MELLITUS WITH HYPERGLYCEMIA: Status: RESOLVED | Noted: 2023-12-14 | Resolved: 2023-12-16

## 2023-12-16 LAB
ALBUMIN SERPL BCP-MCNC: 2.6 G/DL (ref 3.5–5.2)
ALP SERPL-CCNC: 150 U/L (ref 55–135)
ALT SERPL W/O P-5'-P-CCNC: 21 U/L (ref 10–44)
ANION GAP SERPL CALC-SCNC: 5 MMOL/L (ref 8–16)
AST SERPL-CCNC: 21 U/L (ref 10–40)
BASOPHILS # BLD AUTO: 0.02 K/UL (ref 0–0.2)
BASOPHILS NFR BLD: 0.4 % (ref 0–1.9)
BILIRUB SERPL-MCNC: 0.2 MG/DL (ref 0.1–1)
BUN SERPL-MCNC: 16 MG/DL (ref 6–20)
CALCIUM SERPL-MCNC: 9.1 MG/DL (ref 8.7–10.5)
CHLORIDE SERPL-SCNC: 98 MMOL/L (ref 95–110)
CO2 SERPL-SCNC: 29 MMOL/L (ref 23–29)
CREAT SERPL-MCNC: 0.8 MG/DL (ref 0.5–1.4)
DIFFERENTIAL METHOD: ABNORMAL
EOSINOPHIL # BLD AUTO: 0.2 K/UL (ref 0–0.5)
EOSINOPHIL NFR BLD: 3.1 % (ref 0–8)
ERYTHROCYTE [DISTWIDTH] IN BLOOD BY AUTOMATED COUNT: 15.6 % (ref 11.5–14.5)
EST. GFR  (NO RACE VARIABLE): >60 ML/MIN/1.73 M^2
GLUCOSE SERPL-MCNC: 316 MG/DL (ref 70–110)
HCT VFR BLD AUTO: 27.3 % (ref 37–48.5)
HGB BLD-MCNC: 9 G/DL (ref 12–16)
IMM GRANULOCYTES # BLD AUTO: 0.01 K/UL (ref 0–0.04)
IMM GRANULOCYTES NFR BLD AUTO: 0.2 % (ref 0–0.5)
LYMPHOCYTES # BLD AUTO: 2 K/UL (ref 1–4.8)
LYMPHOCYTES NFR BLD: 41.3 % (ref 18–48)
MAGNESIUM SERPL-MCNC: 1.9 MG/DL (ref 1.6–2.6)
MCH RBC QN AUTO: 24.8 PG (ref 27–31)
MCHC RBC AUTO-ENTMCNC: 33 G/DL (ref 32–36)
MCV RBC AUTO: 75 FL (ref 82–98)
MONOCYTES # BLD AUTO: 0.5 K/UL (ref 0.3–1)
MONOCYTES NFR BLD: 9.6 % (ref 4–15)
NEUTROPHILS # BLD AUTO: 2.2 K/UL (ref 1.8–7.7)
NEUTROPHILS NFR BLD: 45.4 % (ref 38–73)
NRBC BLD-RTO: 0 /100 WBC
PHOSPHATE SERPL-MCNC: 3.3 MG/DL (ref 2.7–4.5)
PLATELET # BLD AUTO: 216 K/UL (ref 150–450)
PMV BLD AUTO: 10.4 FL (ref 9.2–12.9)
POCT GLUCOSE: 227 MG/DL (ref 70–110)
POCT GLUCOSE: 349 MG/DL (ref 70–110)
POCT GLUCOSE: 369 MG/DL (ref 70–110)
POTASSIUM SERPL-SCNC: 4.5 MMOL/L (ref 3.5–5.1)
PROT SERPL-MCNC: 5.8 G/DL (ref 6–8.4)
RBC # BLD AUTO: 3.63 M/UL (ref 4–5.4)
SODIUM SERPL-SCNC: 132 MMOL/L (ref 136–145)
WBC # BLD AUTO: 4.8 K/UL (ref 3.9–12.7)

## 2023-12-16 PROCEDURE — 63600175 PHARM REV CODE 636 W HCPCS: Performed by: PHYSICIAN ASSISTANT

## 2023-12-16 PROCEDURE — 25000003 PHARM REV CODE 250: Performed by: HOSPITALIST

## 2023-12-16 PROCEDURE — 25000003 PHARM REV CODE 250: Performed by: PHYSICIAN ASSISTANT

## 2023-12-16 PROCEDURE — 84100 ASSAY OF PHOSPHORUS: CPT

## 2023-12-16 PROCEDURE — 25000003 PHARM REV CODE 250

## 2023-12-16 PROCEDURE — 85025 COMPLETE CBC W/AUTO DIFF WBC: CPT

## 2023-12-16 PROCEDURE — 36415 COLL VENOUS BLD VENIPUNCTURE: CPT

## 2023-12-16 PROCEDURE — S4991 NICOTINE PATCH NONLEGEND: HCPCS | Performed by: HOSPITALIST

## 2023-12-16 PROCEDURE — 63600175 PHARM REV CODE 636 W HCPCS

## 2023-12-16 PROCEDURE — 80053 COMPREHEN METABOLIC PANEL: CPT

## 2023-12-16 PROCEDURE — 83735 ASSAY OF MAGNESIUM: CPT

## 2023-12-16 RX ORDER — MIRTAZAPINE 15 MG/1
15 TABLET, ORALLY DISINTEGRATING ORAL NIGHTLY
Qty: 30 TABLET | Refills: 11 | Status: CANCELLED | OUTPATIENT
Start: 2023-12-16 | End: 2024-12-15

## 2023-12-16 RX ORDER — ONDANSETRON 4 MG/1
8 TABLET, FILM COATED ORAL EVERY 6 HOURS PRN
Qty: 40 TABLET | Refills: 0 | Status: SHIPPED | OUTPATIENT
Start: 2023-12-16 | End: 2024-01-03

## 2023-12-16 RX ORDER — DOXYCYCLINE HYCLATE 100 MG
100 TABLET ORAL EVERY 12 HOURS
Qty: 7 TABLET | Refills: 0 | Status: SHIPPED | OUTPATIENT
Start: 2023-12-16 | End: 2023-12-31

## 2023-12-16 RX ORDER — ACETAMINOPHEN 500 MG
1000 TABLET ORAL EVERY 8 HOURS PRN
Qty: 42 TABLET | Refills: 0 | Status: SHIPPED | OUTPATIENT
Start: 2023-12-16 | End: 2024-01-03

## 2023-12-16 RX ORDER — NICOTINE 7MG/24HR
1 PATCH, TRANSDERMAL 24 HOURS TRANSDERMAL DAILY
Qty: 28 PATCH | Refills: 2 | Status: SHIPPED | OUTPATIENT
Start: 2023-12-16

## 2023-12-16 RX ORDER — FERROUS SULFATE 220 (44)/5
5 ELIXIR ORAL DAILY
Start: 2023-12-16

## 2023-12-16 RX ORDER — ALBUTEROL SULFATE 0.83 MG/ML
2.5 SOLUTION RESPIRATORY (INHALATION) EVERY 8 HOURS PRN
Refills: 0
Start: 2023-12-16 | End: 2024-12-15

## 2023-12-16 RX ORDER — DICYCLOMINE HYDROCHLORIDE 20 MG/1
20 TABLET ORAL EVERY 6 HOURS PRN
Qty: 120 TABLET | Refills: 0 | Status: SHIPPED | OUTPATIENT
Start: 2023-12-16 | End: 2024-01-15

## 2023-12-16 RX ORDER — AMOXICILLIN 250 MG
1 CAPSULE ORAL DAILY PRN
Qty: 7 TABLET | Refills: 0 | Status: SHIPPED | OUTPATIENT
Start: 2023-12-16 | End: 2023-12-23

## 2023-12-16 RX ORDER — INSULIN ASPART 100 [IU]/ML
4 INJECTION, SOLUTION INTRAVENOUS; SUBCUTANEOUS 3 TIMES DAILY
Qty: 12 ML | Refills: 3 | Status: CANCELLED | OUTPATIENT
Start: 2023-12-16 | End: 2024-12-15

## 2023-12-16 RX ORDER — AMLODIPINE BESYLATE 2.5 MG/1
5 TABLET ORAL DAILY
Qty: 60 TABLET | Refills: 11
Start: 2023-12-16 | End: 2024-12-15

## 2023-12-16 RX ORDER — MAG HYDROX/ALUMINUM HYD/SIMETH 200-200-20
30 SUSPENSION, ORAL (FINAL DOSE FORM) ORAL EVERY 6 HOURS PRN
Qty: 355 ML | Refills: 0 | Status: SHIPPED | OUTPATIENT
Start: 2023-12-16 | End: 2024-12-15

## 2023-12-16 RX ORDER — CETIRIZINE HYDROCHLORIDE 10 MG/1
10 TABLET ORAL DAILY
Qty: 30 TABLET | Refills: 0 | Status: SHIPPED | OUTPATIENT
Start: 2023-12-16 | End: 2024-12-15

## 2023-12-16 RX ORDER — PANTOPRAZOLE SODIUM 40 MG/1
40 TABLET, DELAYED RELEASE ORAL DAILY
Qty: 30 TABLET | Refills: 1 | Status: SHIPPED | OUTPATIENT
Start: 2023-12-16 | End: 2024-12-15

## 2023-12-16 RX ORDER — INSULIN LISPRO 100 [IU]/ML
INJECTION, SOLUTION INTRAVENOUS; SUBCUTANEOUS
Qty: 30 ML | Refills: 11 | Status: SHIPPED | OUTPATIENT
Start: 2023-12-16

## 2023-12-16 RX ORDER — OXYCODONE HYDROCHLORIDE 5 MG/1
5 TABLET ORAL EVERY 6 HOURS PRN
Qty: 20 TABLET | Refills: 0 | Status: SHIPPED | OUTPATIENT
Start: 2023-12-16

## 2023-12-16 RX ORDER — CETIRIZINE HYDROCHLORIDE 5 MG/1
5 TABLET ORAL DAILY
Status: DISCONTINUED | OUTPATIENT
Start: 2023-12-16 | End: 2023-12-16 | Stop reason: HOSPADM

## 2023-12-16 RX ADMIN — INSULIN ASPART 10 UNITS: 100 INJECTION, SOLUTION INTRAVENOUS; SUBCUTANEOUS at 07:12

## 2023-12-16 RX ADMIN — INSULIN ASPART 5 UNITS: 100 INJECTION, SOLUTION INTRAVENOUS; SUBCUTANEOUS at 02:12

## 2023-12-16 RX ADMIN — NICOTINE 1 PATCH: 7 PATCH, EXTENDED RELEASE TRANSDERMAL at 08:12

## 2023-12-16 RX ADMIN — INSULIN ASPART 4 UNITS: 100 INJECTION, SOLUTION INTRAVENOUS; SUBCUTANEOUS at 07:12

## 2023-12-16 RX ADMIN — OXYCODONE HYDROCHLORIDE 5 MG: 5 TABLET ORAL at 08:12

## 2023-12-16 RX ADMIN — ONDANSETRON 4 MG: 2 INJECTION INTRAMUSCULAR; INTRAVENOUS at 06:12

## 2023-12-16 RX ADMIN — ALUMINUM HYDROXIDE, MAGNESIUM HYDROXIDE, AND SIMETHICONE 30 ML: 200; 200; 20 SUSPENSION ORAL at 12:12

## 2023-12-16 RX ADMIN — CETIRIZINE HYDROCHLORIDE 5 MG: 5 TABLET, FILM COATED ORAL at 12:12

## 2023-12-16 RX ADMIN — INSULIN ASPART 4 UNITS: 100 INJECTION, SOLUTION INTRAVENOUS; SUBCUTANEOUS at 12:12

## 2023-12-16 RX ADMIN — ALUMINUM HYDROXIDE, MAGNESIUM HYDROXIDE, AND SIMETHICONE 30 ML: 200; 200; 20 SUSPENSION ORAL at 06:12

## 2023-12-16 RX ADMIN — INSULIN DETEMIR 6 UNITS: 100 INJECTION, SOLUTION SUBCUTANEOUS at 08:12

## 2023-12-16 RX ADMIN — PANTOPRAZOLE SODIUM 40 MG: 40 TABLET, DELAYED RELEASE ORAL at 08:12

## 2023-12-16 RX ADMIN — ACETAMINOPHEN 1000 MG: 500 TABLET ORAL at 06:12

## 2023-12-16 RX ADMIN — DOXYCYCLINE HYCLATE 100 MG: 100 TABLET, FILM COATED ORAL at 08:12

## 2023-12-16 NOTE — ASSESSMENT & PLAN NOTE
BG goal: 140-180   Phenotypically  and presentation with DKA more consistent with T1DM. Pt not cooperative during interview as she reports she has told everyone, everything already. On insulin tandem pump at home, she reports taking it off but unclear when.  Comes in with DKA which has since resolved. She is working to get her cousin to deliver tandem pump and dexcom.  BG elevating overnight, but pt reportedly eating all the time overnight.    - Continue Levemir 6 units BID tomorrow.  - Continue Novolog 4 units AC,   - Continue Novolog Moderate dose correction with ISF 25 starting at 150    - POCT Glucose before meals, at bedtime and at 2 am  - Hypoglycemia protocol in place  - Requested that pt bring her Tandem pump in with a Dexcom G6.      ** Please notify Endocrine for any change and/or advance in diet**  ** Please call Endocrine for any BG related issues **     Discharge Planning:   TBD. Please notify endocrinology prior to discharge.

## 2023-12-16 NOTE — PLAN OF CARE
[facility] Tele[facility]      HOME HEALTH ORDERS  FACE TO FACE ENCOUNTER    Patient Name: Wilfrido Elias  YOB: 1985    PCP: No, Primary Doctor   PCP Address: None  PCP Phone Number: None  PCP Fax: None    Encounter Date: 12/12/23    Admit to Home Health    Diagnoses:  Active Hospital Problems    Diagnosis  POA    *Severe sepsis [A41.9, R65.20]  Yes    Hyponatremia [E87.1]  Unknown    Methamphetamine abuse [F15.10]  Yes    Diabetes mellitus with hyperglycemia [E11.65]  Yes    DKA (diabetic ketoacidosis) [E11.10]  Yes    Microcytic anemia [D50.9]  Yes    ANNA (acute kidney injury) [N17.9]  Yes    High anion gap metabolic acidosis [E87.29]  Yes    Lactic acidosis [E87.20]  Yes    Acute metabolic encephalopathy [G93.41]  Yes      Resolved Hospital Problems    Diagnosis Date Resolved POA    Hyperkalemia [E87.5] 12/14/2023 Yes    Hyperphosphatemia [E83.39] 12/14/2023 Yes       Follow Up Appointments:  No future appointments.    Allergies:Review of patient's allergies indicates:  Not on File    Medications: Review discharge medications with patient and family and provide education.    Current Facility-Administered Medications   Medication Dose Route Frequency Provider Last Rate Last Admin    acetaminophen tablet 1,000 mg  1,000 mg Oral Q8H Chris Mcdaniel MD   1,000 mg at 12/16/23 0619    aluminum-magnesium hydroxide-simethicone 200-200-20 mg/5 mL suspension 30 mL  30 mL Oral QID (AC & HS) Chris Mcdaniel MD   30 mL at 12/16/23 1212    cetirizine tablet 5 mg  5 mg Oral Daily Chris Mcdaniel MD   5 mg at 12/16/23 1212    dextrose 10% bolus 125 mL 125 mL  12.5 g Intravenous PRN Jose Bradley PA-C        dextrose 10% bolus 250 mL 250 mL  25 g Intravenous PRN Jose Bradley PA-C        doxycycline tablet 100 mg  100 mg Oral Q12H Mahamed Hernandez MD   100 mg at 12/16/23 0852    enoxaparin injection 30 mg  30 mg Subcutaneous Q24H (prophylaxis, 1700) Chris Mcdaniel,  MD   30 mg at 12/15/23 1748    glucagon (human recombinant) injection 1 mg  1 mg Intramuscular PRN Jose Bradley PA-C        glucose chewable tablet 16 g  16 g Oral PRN Jose Bradley PA-C        glucose chewable tablet 24 g  24 g Oral PRN Jose Bradley PA-C        insulin aspart U-100 pen 0-10 Units  0-10 Units Subcutaneous QID (AC + HS) PRN Jose Bradley PA-C   4 Units at 12/16/23 1213    insulin aspart U-100 pen 4 Units  4 Units Subcutaneous TIDWM Jose Bradley PA-C   4 Units at 12/16/23 1212    insulin detemir U-100 (Levemir) pen 6 Units  6 Units Subcutaneous BID Jose Bradley PA-C   6 Units at 12/16/23 0800    lactated ringers bolus 1,000 mL  1,000 mL Intravenous Once Noah Leonard MD        mirtazapine disintegrating tablet 15 mg  15 mg Oral Nightly Mahamed Hernandez MD   15 mg at 12/15/23 2049    nicotine 7 mg/24 hr 1 patch  1 patch Transdermal Daily Chris Mcdaniel MD   1 patch at 12/16/23 0852    ondansetron injection 4 mg  4 mg Intravenous Q6H PRN Mónica Stringre MD   4 mg at 12/16/23 0619    oxyCODONE immediate release tablet 5 mg  5 mg Oral Q6H PRN Chris Mcdaniel MD   5 mg at 12/16/23 0852    pantoprazole EC tablet 40 mg  40 mg Oral Daily Chris Mcdaniel MD   40 mg at 12/16/23 0852    senna-docusate 8.6-50 mg per tablet 1 tablet  1 tablet Oral Daily PRN Chris Mcdaniel MD        sodium chloride 0.9% flush 10 mL  10 mL Intravenous PRN Silvestre Pulliam MD        sodium chloride 0.9% flush 10 mL  10 mL Intravenous PRN Mónica Stringer MD        sodium chloride 0.9% flush 10 mL  10 mL Intravenous PRN Mónica Stringer MD         Current Discharge Medication List        START taking these medications    Details   acetaminophen (TYLENOL) 500 MG tablet Take 2 tablets (1,000 mg total) by mouth every 8 (eight) hours as needed for Pain.  Qty: 42 tablet, Refills: 0      albuterol (PROVENTIL) 2.5 mg /3 mL (0.083 %)  nebulizer solution Take 3 mLs (2.5 mg total) by nebulization every 8 (eight) hours as needed for Wheezing. Rescue  Refills: 0      aluminum-magnesium hydroxide-simethicone (MAALOX) 200-200-20 mg/5 mL Susp Take 30 mLs by mouth every 6 (six) hours as needed.  Qty: 355 mL, Refills: 0      amLODIPine (NORVASC) 2.5 MG tablet Take 2 tablets (5 mg total) by mouth once daily.  Qty: 60 tablet, Refills: 11    Comments: .      cetirizine (ZYRTEC) 10 MG tablet Take 1 tablet (10 mg total) by mouth once daily.  Qty: 30 tablet, Refills: 0      dicyclomine (BENTYL) 20 mg tablet Take 1 tablet (20 mg total) by mouth every 6 (six) hours as needed (abdominal pain or cramping).  Qty: 120 tablet, Refills: 0      doxycycline (VIBRA-TABS) 100 MG tablet Take 1 tablet (100 mg total) by mouth every 12 (twelve) hours. for 7 doses  Qty: 7 tablet, Refills: 0      ferrous sulfate (FEROSUL) 220 mg (44 mg iron)/5 mL Elix Take 5 mLs (220 mg total) by mouth once daily.      insulin lispro 100 unit/mL injection Pt to use insulin lispro in her insulin pump as needed.  Total max daily dose 100  Qty: 30 mL, Refills: 11      nicotine (NICODERM CQ) 7 mg/24 hr Place 1 patch onto the skin once daily.  Qty: 28 patch, Refills: 2      ondansetron (ZOFRAN) 4 MG tablet Take 2 tablets (8 mg total) by mouth every 6 (six) hours as needed for Nausea.  Qty: 40 tablet, Refills: 0      oxyCODONE (ROXICODONE) 5 MG immediate release tablet Take 1 tablet (5 mg total) by mouth every 6 (six) hours as needed.  Qty: 20 tablet, Refills: 0    Comments: Quantity prescribed more than 7 day supply? No      pantoprazole (PROTONIX) 40 MG tablet Take 1 tablet (40 mg total) by mouth once daily.  Qty: 30 tablet, Refills: 1      senna-docusate 8.6-50 mg (PERICOLACE) 8.6-50 mg per tablet Take 1 tablet by mouth daily as needed for Constipation.  Qty: 7 tablet, Refills: 0               I have seen and examined this patient within the last 30 days. My clinical findings that support the need  for the home health skilled services and home bound status are the following:no   Weakness/numbness causing balance and gait disturbance due to Weakness/Debility making it taxing to leave home.     Diet:   diabetic diet 2000 calorie      Referrals/ Consults  Aide to provide assistance with personal care, ADLs, and vital signs.    Activities:   activity as tolerated    Nursing:   Agency to admit patient within 24 hours of hospital discharge unless specified on physician order or at patient request    SN to complete comprehensive assessment including routine vital signs. Instruct on disease process and s/s of complications to report to MD. Review/verify medication list sent home with the patient at time of discharge  and instruct patient/caregiver as needed. Frequency may be adjusted depending on start of care date.     Skilled nurse to perform up to 3 visits PRN for symptoms related to diagnosis    Notify MD if SBP > 160 or < 90; DBP > 90 or < 50; HR > 120 or < 50; Temp > 101; O2 < 88%;     Ok to schedule additional visits based on staff availability and patient request on consecutive days within the home health episode.    When multiple disciplines ordered:    Start of Care occurs on Sunday - Wednesday schedule remaining discipline evaluations as ordered on separate consecutive days following the start of care.    Thursday SOC -schedule subsequent evaluations Friday and Monday the following week.     Friday - Saturday SOC - schedule subsequent discipline evaluations on consecutive days starting Monday of the following week.    For all post-discharge communication and subsequent orders please contact patient's primary care physician. If unable to reach primary care physician or do not receive response within 30 minutes, please contact [phone] for clinical staff order clarification    Miscellaneous   Routine Skin for Bedridden Patients: Instruct patient/caregiver to apply moisture barrier cream to all skin folds  and wet areas in perineal area daily and after baths and all bowel movements.    Home Health Aide:  Nursing every 48 hours and Home Health Aide every 48 hours    Wound Care Orders  no    I certify that this patient is confined to her home and needs intermittent skilled nursing care.

## 2023-12-16 NOTE — PLAN OF CARE
Per Dr. Mcdaniel pt will need out pt substance abuse resources and HH for discharge . CM following.    12/16/23 0921   Discharge Assessment   Assessment Type Discharge Planning Reassessment

## 2023-12-16 NOTE — PLAN OF CARE
[facility]  Discharge Final Note    Primary Care Provider: No, Primary Doctor    Expected Discharge Date: 12/16/2023    The pt is cleared for discharge home from case management and will be contacted for PCP follow up.     Final Discharge Note (most recent)       Final Note - 12/16/23 1300          Final Note    Assessment Type Final Discharge Note     Anticipated Discharge Disposition Home or Self Care     What phone number can be called within the next 1-3 days to see how you are doing after discharge? [phone]     Hospital Resources/Appts/Education Provided Post-Acute resouces added to AVS        Post-Acute Status    Discharge Delays None known at this time                     Important Message from Medicare             Contact Info       Hospital follow up    Jasmeet [facility] will contact the pt with a hospital follow up appointment.       Next Steps: Follow up

## 2023-12-16 NOTE — DISCHARGE SUMMARY
Ahsan Braxton - Telemetry [facility] Medicine  Discharge Summary      Patient Name: Wilfrido Elias  MRN: [id number]  DAXA: [id number]  Patient Class: IP- Inpatient  Admission Date: 12/12/2023  Hospital Length of Stay: 4 days  Discharge Date and Time:  12/16/2023 8:54 AM  Attending Physician: Chris Mcdaniel MD   Discharging Provider: Chris Mcdaniel MD  Primary Care Provider: Allison Primary Doctor  [facility] Medicine Team: [facility] R Chris Mcdaniel MD  Primary Care Team: [facility] MED     HPI:   This is a 36 year old lady with history of diabetes who presented to the ED for altered mental status. History obtained primarily from chart review. Per EMS pt was 98% on room air with wheezing. Duoneb given enroute with improvement. Per EMS pt CBG came back as greater than 600 and is 913 on admission. Pt is unconscious and non verbal in the ED, she is not answering questions and is unrousable. Per ED note her family was concerned because she seemed a bit out of it. Per chart review she has not been taking her insulin lately.     In the ED, patietn was tachycardia, tachypnea, elevated WBC, and elevated lactic in the setting of DKA. Unclear source of infection as UA and CXR were unremarkable. Patient was admitted to ICU for further management.             * No surgery found *      Hospital Course:     Patient was admitted to ICU for acute encephalopathy, DKA, and severe sepsis . BG came down with insulin gtt and gap closed. Patient was mentating better. mentating better.       12/14 Transfer to hospital medicine  started on tylenol 1000mg q 8h for abdominal pain. Maalox for heartburn. Leucocytosis and ANNA resolved. P replaced . Endocrine consulted. c/o heart burn and 8/10 abdominal pain  no relief with maalox. likely gastroparesis.  X ray abd - No significant intra-abdominal abnormality. lipase. started protonix and oxycodone PRN . CT abdomen pelvis ordered   12/15 poor oral intake. insulin dose decreased.  glucose 300s.sodium trending down 131 . urine lytes . was on adderall 30 mg bid, stopped 2 months back as her psychiatrist not available for prescription.  since then smoking meth twice daily. psychiatry consulted for Methamphetamine use/ ADHD.  Nicotine patch for smoking. counseled regarding cessation   12/16 Glucose 350s today. patient had insulin pump from home set up at discharge.  abdominal pain resolved. discharge home with       Goals of Care Treatment Preferences:  Code Status: Full Code      Consults:   Consults (From admission, onward)          Status Ordering Provider     Inpatient consult to Psychiatry  Once        Provider:  (Not yet assigned)    Completed DON BRAR     Inpatient consult to Endocrinology  Once        Provider:  (Not yet assigned)    Completed DON BRAR     Inpatient consult to Registered Dietitian/Nutritionist  Once        Provider:  (Not yet assigned)    Completed ADILENE AGUILLON     Inpatient consult to Critical Care Medicine  Once        Provider:  (Not yet assigned)    Completed VIRY CUEVAS               Assessment/Plan:      * Severe sepsis     36 yoF with unknown past medical history presented with tachycardia, tachypnea, elevated WBC, and elevated lactic in the setting of DKA. Unclear source of infection as UA and CXR were unremarkable. History was minimal as patient is encephalopathic. Likely source is multiple skin infections noted on patient.      - d/c vanc and zosyn and start doxycycline  - f/u Bcx and tailor antibiotics     12/14 BCX 2 NGTD. on doxycycline           Methamphetamine abuse  12/15 was on adderall 30 mg bid, stopped 2 months back as her psychiatrist not available for prescription.  since then smoking meth twice daily. psychiatry consulted for Methamphetamine use/ ADHD.  Nicotine patch for smoking. counseled regarding cessation  psychiatry consulted for Methamphetamine use/ ADHD. needs addiction rehab program after discharge          Hyponatremia  sodium trending down 131 .  .likely poor oral intake       Recent Labs   Lab 12/16/23  0604   *         Acute metabolic encephalopathy     Likely 2/2 to sepsis or DKA.      - continue monitoring     12/14 U tox with amphetamines. TSH WNL. addiction psychiatry eval         Lactic acidosis  resolved      High anion gap metabolic acidosis     Likely 2/2 DKA vs lactic acidosis. resolved         ANNA (acute kidney injury)     Cr 2.0. likely prerenal ANNA in the setting of dehydration and DKA.      - IVF and resume diet  - trend Cr  - avoid nephrotoxic medication and renally dose   resolved           Recent Labs   Lab 12/15/23  0345 12/15/23  1828 12/16/23  0604   BUN 13 13 16   CREATININE 0.7 0.9 0.8         I & O      Intake/Output Summary (Last 24 hours) at 12/16/2023 0820  Last data filed at 12/15/2023 1818      Gross per 24 hour   Intake 1050 ml   Output no documentation   Net 1050 ml         Microcytic anemia     Hgb of 10 and MCV of 77. No evidence of bleeding     - trend Hgb and transfuse <7              Recent Labs   Lab 12/14/23  0612 12/15/23  0345 12/16/23  0604   HGB 9.6* 9.6* 9.0*                   Component Value Date/Time     MCV 75 (L) 12/16/2023 0604     RDW 15.6 (H) 12/16/2023 0604     IRON 71 12/13/2023 0322     FERRITIN 32 12/13/2023 0322      Monitor CBC and transfuse if H/H drops below 7/21.       DKA (diabetic ketoacidosis)     BG >500 on multiple POCT glucose. BHB elevated. A1c of 13.6. repeat labs showed closed gap and acidosis mostly resolved.      - patient transitioned to basal bolus insulin  - BMP q4  - replete K as needed  - nutrition consulted    Patient's FSGs are controlled on current medication regimen.  Last A1c reviewed-         Lab Results   Component Value Date     HGBA1C 13.6 (H) 12/12/2023      Most recent fingerstick glucose reviewed-          Recent Labs   Lab 12/15/23  1634 12/15/23  2047 12/16/23  0212 12/16/23  0752   POCTGLUCOSE 154* 225* 349* 369*          Antihyperglycemics (From admission, onward)        Start     Stop Route Frequency Ordered     12/16/23 0900   insulin detemir U-100 (Levemir) pen 6 Units         -- SubQ 2 times daily 12/15/23 1109     12/14/23 1130   insulin aspart U-100 pen 4 Units         -- SubQ 3 times daily with meals 12/14/23 0919     12/14/23 1017   insulin aspart U-100 pen 0-10 Units         -- SubQ Before meals & nightly PRN 12/14/23 0919     12/12/23 1245   insulin regular in 0.9 % NaCl 100 unit/100 mL (1 unit/mL) infusion        Question Answer Comment   Initial dose (DO NOT CHANGE): 0.1 units/kg/hr     Insulin Rate Adjustment (DO NOT MODIFY ANSWER) [email or web address]\Images\Pharmacy\InsulinInfusions\INSULIN ADJUSTMENT DKA version [id number].pdf         12/13/23 0130 IV Continuous 12/12/23 1235             12/14 Endocrine consulted      Final Active Diagnoses:    Diagnosis Date Noted POA    PRINCIPAL PROBLEM:  Severe sepsis [A41.9, R65.20] 12/12/2023 Yes    Hyponatremia [E87.1] 12/15/2023 Unknown    Methamphetamine abuse [F15.10] 12/15/2023 Yes    Diabetes mellitus with hyperglycemia [E11.65] 12/14/2023 Yes    DKA (diabetic ketoacidosis) [E11.10] 12/12/2023 Yes    Microcytic anemia [D50.9] 12/12/2023 Yes    ANNA (acute kidney injury) [N17.9] 12/12/2023 Yes    High anion gap metabolic acidosis [E87.29] 12/12/2023 Yes    Lactic acidosis [E87.20] 12/12/2023 Yes    Acute metabolic encephalopathy [G93.41] 12/12/2023 Yes      Problems Resolved During this Admission:    Diagnosis Date Noted Date Resolved POA    Hyperkalemia [E87.5] 12/12/2023 12/14/2023 Yes    Hyperphosphatemia [E83.39] 12/12/2023 12/14/2023 Yes       Medications:  Reconciled Home Medications:      Medication List        Start taking these medications      acetaminophen 500 MG tablet  Commonly known as: TYLENOL  Take 2 tablets (1,000 mg total) by mouth every 8 (eight) hours as needed for Pain.     albuterol 2.5 mg /3 mL (0.083 %) nebulizer solution  Commonly known as:  PROVENTIL  Take 3 mLs (2.5 mg total) by nebulization every 8 (eight) hours as needed for Wheezing. Rescue     aluminum-magnesium hydroxide-simethicone 200-200-20 mg/5 mL Susp  Commonly known as: MAALOX  Take 30 mLs by mouth every 6 (six) hours as needed.     amLODIPine 2.5 MG tablet  Commonly known as: NORVASC  Take 2 tablets (5 mg total) by mouth once daily.     cetirizine 10 MG tablet  Commonly known as: ZYRTEC  Take 1 tablet (10 mg total) by mouth once daily.     dicyclomine 20 mg tablet  Commonly known as: BENTYL  Take 1 tablet (20 mg total) by mouth every 6 (six) hours as needed (abdominal pain or cramping).     doxycycline 100 MG tablet  Commonly known as: VIBRA-TABS  Take 1 tablet (100 mg total) by mouth every 12 (twelve) hours. for 7 doses     ferrous sulfate 220 mg (44 mg iron)/5 mL Elix  Commonly known as: FEROSUL  Take 5 mLs (220 mg total) by mouth once daily.     insulin lispro 100 unit/mL injection  Pt to use insulin lispro in her insulin pump as needed.  Total max daily dose 100     nicotine 7 mg/24 hr  Commonly known as: NICODERM CQ  Place 1 patch onto the skin once daily.     ondansetron 4 MG tablet  Commonly known as: ZOFRAN  Take 2 tablets (8 mg total) by mouth every 6 (six) hours as needed for Nausea.     oxyCODONE 5 MG immediate release tablet  Commonly known as: ROXICODONE  Take 1 tablet (5 mg total) by mouth every 6 (six) hours as needed.     pantoprazole 40 MG tablet  Commonly known as: PROTONIX  Take 1 tablet (40 mg total) by mouth once daily.     senna-docusate 8.6-50 mg 8.6-50 mg per tablet  Commonly known as: PERICOLACE  Take 1 tablet by mouth daily as needed for Constipation.                Discharged Condition: fair    Disposition: [place]-Health [place] [facility] [place]            Follow Up:     Patient Instructions:   No discharge procedures on file.    Laboratory/Diagnostic Data:    CBC/Anemia Labs: Coags:    Recent Labs   Lab 12/13/23  0322 12/14/23  0612 12/15/23  0345 12/16/23  0604   WBC 17.17* 8.27  "4.36 4.80   HGB 8.5* 9.6* 9.6* 9.0*   HCT 24.9* 27.5* 29.1* 27.3*    260 244 216   MCV 72* 70* 75* 75*   RDW 15.4* 15.7* 15.5* 15.6*   IRON 71  --   --   --    FERRITIN 32  --   --   --     No results for input(s): "PT", "INR", "APTT" in the last 168 hours.     Chemistries: ABG:   Recent Labs   Lab 12/14/23  0612 12/15/23  0345 12/15/23  1828 12/16/23  0604    131* 134* 132*   K 3.9 4.5 4.3 4.5    97 96 98   CO2 28 27 25 29   BUN 13 13 13 16   CREATININE 0.7 0.7 0.9 0.8   CALCIUM 9.0 9.1 9.6 9.1   PROT 6.4 6.2  --  5.8*   BILITOT 0.3 0.4  --  0.2   ALKPHOS 178* 174*  --  150*   ALT 31 26  --  21   AST 34 32  --  21   MG 2.2 2.1  --  1.9   PHOS 2.4* 2.9 2.7 3.3    Recent Labs   Lab 12/12/23  0717   PH 7.119*   PCO2 21.1*   PO2 42   HCO3 6.8*   POCSATURATED 63   BE -23*        POCT Glucose: HbA1c:    Recent Labs   Lab 12/15/23  1225 12/15/23  1634 12/15/23  2047 12/16/23  0212 12/16/23  0752 12/16/23  1211   POCTGLUCOSE 265* 154* 225* 349* 369* 227*    Hemoglobin A1C   Date Value Ref Range Status   12/12/2023 13.6 (H) 4.0 - 5.6 % Final     Comment:     ADA Screening Guidelines:  5.7-6.4%  Consistent with prediabetes  >or=6.5%  Consistent with diabetes    High levels of fetal hemoglobin interfere with the HbA1C  assay. Heterozygous hemoglobin variants (HbS, HgC, etc)do  not significantly interfere with this assay.   However, presence of multiple variants may affect accuracy.          Cardiac Enzymes: Ejection Fractions:    Recent Labs     12/14/23  1029   TROPONINI 0.014    No results found for: "EF"       No results for input(s): "COLORU", "APPEARANCEUA", "PHUR", "SPECGRAV", "PROTEINUA", "GLUCUA", "KETONESU", "BILIRUBINUA", "OCCULTUA", "NITRITE", "UROBILINOGEN", "LEUKOCYTESUR", "RBCUA", "WBCUA", "BACTERIA", "SQUAMEPITHEL", "HYALINECASTS" in the last 48 hours.    Invalid input(s): "WRIGHTSUR"    Lactate (Lactic Acid) (mmol/L)   Date Value   12/13/2023 0.9   12/12/2023 4.0 ()     No results found " "for: "BNP"  No results found for: "CRP", "SEDRATE"  No results found for: "DDIMER"  Ferritin (ng/mL)   Date Value   12/13/2023 32     No results found for: "LDH"  Troponin I (ng/mL)   Date Value   12/14/2023 0.014     CPK (U/L)   Date Value   12/12/2023 80     No results found for this or any previous visit.  SARS-CoV2 (COVID-19) Qualitative PCR (no units)   Date Value   12/12/2023 Not Detected       Microbiology labs for the last week  Microbiology Results (last 7 days)       Procedure Component Value Units Date/Time    Blood Culture #1 **CANNOT BE ORDERED STAT** [[id number]] Collected: 12/12/23 0810    Order Status: Completed Specimen: Blood from Peripheral, Antecubital, Left Updated: 12/16/23 1012     Blood Culture, Routine No Growth to date      No Growth to date      No Growth to date      No Growth to date      No Growth to date    Blood Culture #2 **CANNOT BE ORDERED STAT** [[id number]] Collected: 12/12/23 0810    Order Status: Completed Specimen: Blood from Peripheral, Antecubital, Right Updated: 12/16/23 1012     Blood Culture, Routine No Growth to date      No Growth to date      No Growth to date      No Growth to date      No Growth to date              Reviewed and noted in plan where applicable- Please see chart for full lab data.    Lines/Drains:       Peripheral IV - Single Lumen 12/12/23 1152 18 G Right Antecubital (Active)   Site Assessment Clean;Dry;Intact 12/16/23 0400   Extremity Assessment Distal to IV No abnormal discoloration;No redness;No swelling;No warmth 12/14/23 1950   Line Status Saline locked 12/16/23 0400   Dressing Status Clean;Dry;Intact 12/16/23 0400   Dressing Intervention Integrity maintained 12/16/23 0000   Dressing Change Due 12/16/23 12/13/23 2000   Site Change Due 12/16/23 12/13/23 2000   Reason Not Rotated Not due 12/13/23 2000   Number of days: 3       Imaging  ECG Results              EKG 12-lead (Final result)  Result time 12/12/23 10:43:46      Final result by Interface, " Lab In [facility] (12/12/23 10:43:46)               Narrative:    Test Reason : R73.9,    Vent. Rate : 113 BPM     Atrial Rate : 113 BPM     P-R Int : 162 ms          QRS Dur : 088 ms      QT Int : 350 ms       P-R-T Axes : 074 090 039 degrees     QTc Int : 480 ms    Probably  Sinus tachycardia  Possible Left atrial enlargement  Rightward axis  Possible  Anteroseptal infarct ,age undetermined  Abnormal ECG  No previous ECGs available  Confirmed by Venkat AGUILAR MD ([id number]) on 12/12/2023 10:43:36 AM    Referred By: [email or web address]   SELF           Confirmed By:Venkat AGUILAR MD                                  No results found for this or any previous visit.      CT Abdomen Pelvis  Without Contrast  Narrative: EXAMINATION:  CT ABDOMEN PELVIS WITHOUT CONTRAST    CLINICAL HISTORY:  abd pain;    TECHNIQUE:  Low dose axial images, sagittal and coronal reformations were obtained from the lung bases to the pubic symphysis.  Oral contrast was not administered.    COMPARISON:  Retroperitoneal ultrasound 12/13/2023, abdominal x-ray 12/14/2023    FINDINGS:  SOFT TISSUES: Small foci of subcutaneous emphysema in the lower abdominal wall, likely injection related.    MEDIASTINUM: Heart is normal size. No pericardial effusion.    LUNG BASES: Unremarkable.    HEPATOBILIARY: Liver is enlarged measuring 19.5 cm.  No focal hepatic lesions. No biliary ductal dilatation. Normal gallbladder.    PANCREAS: No focal masses or ductal dilatation.    SPLEEN: Normal size.    ADRENALS: No adrenal nodules.    KIDNEYS/URETERS: Kidneys are normal size and location.  No nephrolithiasis or hydronephrosis. No solid mass lesions. Ureters are unremarkable.    BLADDER/PELVIC ORGANS: Unremarkable.    PERITONEUM / RETROPERITONEUM: No free air or fluid.    LYMPH NODES: No lymphadenopathy.    VESSELS: Unremarkable.    GI TRACT: Stomach and duodenum are unremarkable.  No evidence of bowel obstruction or inflammation.  Appendix is not visualized and there is a suture line in  the right lower quadrant, suggesting appendectomy.    BONES: No acute fractures or suspicious osseous lesions.  Impression: Hepatomegaly.    No other significant abnormality.    Electronically signed by resident: Ivan Shanks  Date:    12/15/2023  Time:    09:08    Electronically signed by: Bruno Townsend  Date:    12/15/2023  Time:    11:02      Imaging:  Imaging Results              X-Ray Chest AP Portable (Final result)  Result time 12/12/23 07:04:41      Final result by Natanael Reeder MD (12/12/23 07:04:41)               Impression:      No significant intrathoracic abnormality.      Electronically signed by: Natanael Reedre MD  Date:    12/12/2023  Time:    07:04             Narrative:    EXAMINATION:  XR CHEST AP PORTABLE    CLINICAL HISTORY:  hyperglycemia;    TECHNIQUE:  One view    COMPARISON:  No prior chest radiographs are currently available for comparison purposes.  Clinical information of altered mental status/hyperglycemia.    FINDINGS:  Heart size is normal, as is the appearance of the pulmonary vascularity.  Lung zones are clear, and are free of significant airspace consolidation or volume loss.  No pleural fluid.  No hilar or mediastinal mass lesion.  No pneumothorax.                                      Follow Up Instructions:     No future appointments.    Discharge Instructions:  Discharge Procedure Orders   Ambulatory referral/consult to Endocrinology   Standing Status: Future   Referral Priority: Routine Referral Type: Consultation   Requested Specialty: Endocrinology   Number of Visits Requested: 1     Ambulatory referral/consult to Psychiatry   Standing Status: Future   Referral Priority: Routine Referral Type: Psychiatric   Referral Reason: Specialty Services Required   Requested Specialty: Psychiatry   Number of Visits Requested: 1         Total time spent on discharge 40  minutes     Chris Mcdaniel MD  Attending Staff Physician  [facility]

## 2023-12-16 NOTE — NURSING
Pt discharged from unit to home with family via personal transportation. Discharge summary reviewed, verbalizes understanding. Personal items returned. Left unit ambulating with no assist. NDK.

## 2023-12-16 NOTE — PLAN OF CARE
Message sent to [facility] to get pt scheduled with [facility] care [place] for patients with medicaid coverage. Pt will be contacted with a discharge appointment.    12/16/23 1580   Discharge Assessment   Assessment Type Discharge Planning Reassessment

## 2023-12-16 NOTE — PLAN OF CARE
Problem: Adult Inpatient Plan of Care  Goal: Plan of Care Review  Outcome: Met  Goal: Patient-Specific Goal (Individualized)  Outcome: Met  Goal: Absence of Hospital-Acquired Illness or Injury  Outcome: Met  Goal: Optimal Comfort and Wellbeing  Outcome: Met  Goal: Readiness for Transition of Care  Outcome: Met     Problem: Infection  Goal: Absence of Infection Signs and Symptoms  Outcome: Met     Problem: Diabetes Comorbidity  Goal: Blood Glucose Level Within Targeted Range  Outcome: Met     Problem: Adjustment to Illness (Sepsis/Septic Shock)  Goal: Optimal Coping  Outcome: Met     Problem: Bleeding (Sepsis/Septic Shock)  Goal: Absence of Bleeding  Outcome: Met     Problem: Glycemic Control Impaired (Sepsis/Septic Shock)  Goal: Blood Glucose Level Within Desired Range  Outcome: Met     Problem: Infection Progression (Sepsis/Septic Shock)  Goal: Absence of Infection Signs and Symptoms  Outcome: Met     Problem: Nutrition Impaired (Sepsis/Septic Shock)  Goal: Optimal Nutrition Intake  Outcome: Met     Problem: Fluid and Electrolyte Imbalance (Acute Kidney Injury/Impairment)  Goal: Fluid and Electrolyte Balance  Outcome: Met     Problem: Oral Intake Inadequate (Acute Kidney Injury/Impairment)  Goal: Optimal Nutrition Intake  Outcome: Met     Problem: Renal Function Impairment (Acute Kidney Injury/Impairment)  Goal: Effective Renal Function  Outcome: Met     Problem: Skin Injury Risk Increased  Goal: Skin Health and Integrity  Outcome: Met     Pt AAOx4. VSS. Complaints of pain to abdomen PRN Oxy 5 administered with full relief obtained. Routine meds administered. Blood glucose monitored. Scheduled and sliding scale insulin given. Continuous bedside cardiac monitoring in place. Safety measures maintained. Call light within reach. POC met. Discharge planning initiated. Monitoring ongoing.

## 2023-12-16 NOTE — SUBJECTIVE & OBJECTIVE
"Interval HPI:   No acute events overnight.  Nurse reports pt eating all night. Patient in room 8084/8084 A. Blood glucose variable. BG at and above goal on current insulin regimen (SSI, prandial, and basal insulin ). Steroid use- None .      Renal function- Normal   Vasopressors-  None     Diet diabetic 1500 Calorie; Standard Tray     Eatin%  Nausea: No  Hypoglycemia and intervention: No  Fever: No  TPN and/or TF: No    /84 (BP Location: Left arm, Patient Position: Lying)   Pulse 97   Temp 98.2 °F (36.8 °C) (Oral)   Resp 18   Ht 5' 6" (1.676 m)   Wt 52 kg (114 lb 10.2 oz)   SpO2 100%   BMI 18.50 kg/m²     Labs Reviewed and Include    Recent Labs   Lab 23  0604   *   CALCIUM 9.1   ALBUMIN 2.6*   PROT 5.8*   *   K 4.5   CO2 29   CL 98   BUN 16   CREATININE 0.8   ALKPHOS 150*   ALT 21   AST 21   BILITOT 0.2     Lab Results   Component Value Date    WBC 4.80 2023    HGB 9.0 (L) 2023    HCT 27.3 (L) 2023    MCV 75 (L) 2023     2023     Recent Labs   Lab 23  0322   TSH 0.428     Lab Results   Component Value Date    HGBA1C 13.6 (H) 2023       Nutritional status:   Body mass index is 18.5 kg/m².  Lab Results   Component Value Date    ALBUMIN 2.6 (L) 2023    ALBUMIN 2.9 (L) 12/15/2023    ALBUMIN 2.7 (L) 12/15/2023     No results found for: "PREALBUMIN"    Estimated Creatinine Clearance: 77.5 mL/min (based on SCr of 0.8 mg/dL).    Accu-Checks  Recent Labs     23  1153 23  1655 23  2119 12/15/23  0204 12/15/23  0754 12/15/23  1225 12/15/23  1634 12/15/23  2047 23  0212 23  0752   POCTGLUCOSE 370* 237* 110 181* 281* 265* 154* 225* 349* 369*       Current Medications and/or Treatments Impacting Glycemic Control  Immunotherapy:    Immunosuppressants       None          Steroids:   Hormones (From admission, onward)      None          Pressors:    Autonomic Drugs (From admission, onward)      None      "     Hyperglycemia/Diabetes Medications:   Antihyperglycemics (From admission, onward)      Start     Stop Route Frequency Ordered    12/16/23 0900  insulin detemir U-100 (Levemir) pen 6 Units         -- SubQ 2 times daily 12/15/23 1109    12/14/23 1130  insulin aspart U-100 pen 4 Units         -- SubQ 3 times daily with meals 12/14/23 0919    12/14/23 1017  insulin aspart U-100 pen 0-10 Units         -- SubQ Before meals & nightly PRN 12/14/23 0919    12/12/23 1245  insulin regular in 0.9 % NaCl 100 unit/100 mL (1 unit/mL) infusion        Question Answer Comment   Initial dose (DO NOT CHANGE): 0.1 units/kg/hr    Insulin Rate Adjustment (DO NOT MODIFY ANSWER) [email or web address]\Images\Pharmacy\InsulinInfusions\INSULIN ADJUSTMENT DKA version [id number].[email or web address]        12/13/23 0130 IV Continuous 12/12/23 1235

## 2023-12-16 NOTE — ASSESSMENT & PLAN NOTE
Cr 2.0. likely prerenal ANNA in the setting of dehydration and DKA.      - IVF and resume diet  - trend Cr  - avoid nephrotoxic medication and renally dose   resolved    Recent Labs   Lab 12/15/23  0345 12/15/23  1828 12/16/23  0604   BUN 13 13 16   CREATININE 0.7 0.9 0.8       I & O     Intake/Output Summary (Last 24 hours) at 12/16/2023 0820  Last data filed at 12/15/2023 1818  Gross per 24 hour   Intake 1050 ml   Output no documentation   Net 1050 ml

## 2023-12-16 NOTE — PROGRESS NOTES
[facility] - Telemetry [facility] Medicine  Progress Note    Patient Name: Wilfrido Elias  MRN: [id number]  Patient Class: IP- Inpatient   Admission Date: 12/12/2023  Length of Stay: 4 days  Attending Physician: Chris Mcdaniel MD  Primary Care Provider: Allison, Primary Doctor        Subjective:     Principal Problem:Severe sepsis        HPI:  This is a 36 year old lady with history of diabetes who presented to the ED for altered mental status. History obtained primarily from chart review. Per EMS pt was 98% on room air with wheezing. Duoneb given enroute with improvement. Per EMS pt CBG came back as greater than 600 and is 913 on admission. Pt is unconscious and non verbal in the ED, she is not answering questions and is unrousable. Per ED note her family was concerned because she seemed a bit out of it. Per chart review she has not been taking her insulin lately.     In the ED, patietn was tachycardia, tachypnea, elevated WBC, and elevated lactic in the setting of DKA. Unclear source of infection as UA and CXR were unremarkable. Patient was admitted to ICU for further management.             Overview/Hospital Course:    Patient was admitted to ICU for acute encephalopathy, DKA, and severe sepsis . BG came down with insulin gtt and gap closed. Patient was mentating better. mentating better.       12/14 Transfer to hospital medicine  started on tylenol 1000mg q 8h for abdominal pain. Maalox for heartburn. Leucocytosis and ANNA resolved. P replaced . Endocrine consulted. c/o heart burn and 8/10 abdominal pain  no relief with maalox. likely gastroparesis.  X ray abd - No significant intra-abdominal abnormality. lipase. started protonix and oxycodone PRN . CT abdomen pelvis ordered   12/15 poor oral intake. insulin dose decreased. glucose 300s.sodium trending down 131 . urine lytes . was on adderall 30 mg bid, stopped 2 months back as her psychiatrist not available for prescription.  since then smoking meth  twice daily. psychiatry consulted for Methamphetamine use/ ADHD.  Nicotine patch for smoking. counseled regarding cessation   12/16 Glucose 350s today. patient had insulin pump from home set up at discharge.  abdominal pain resolved. discharge home with          Review of Systems:   Pain scale:  Constitutional:  fever,  chills, headache, vision loss, hearing loss, weight loss, Generalized weakness, falls, loss of smell, loss of taste, poor appetite,  sore throat  Respiratory: cough, shortness of breath.   Cardiovascular: chest pain, dizziness, palpitations, orthopnea, swelling of feet, syncope  Gastrointestinal: nausea, vomiting, abdominal pain, diarrhea, black stool,  blood in stool, change in bowel habits, constipation  Genitourinary: hematuria, dysuria, urgency, frequency  Integument/Breast: rash,  pruritis  Hematologic/Lymphatic: easy bruising, lymphadenopathy  Musculoskeletal: arthralgias , myalgias, back pain, neck pain, knee pain  Neurological: confusion, seizures, tremors, slurred speech  Behavioral/Psych:  depression, anxiety, auditory or visual hallucinations     OBJECTIVE:     Physical Exam:  Body mass index is 18.5 kg/m².    Constitutional: Appears  thin built   Head: Normocephalic and atraumatic.   Neck: Normal range of motion. Neck supple.   Cardiovascular: Normal heart rate.  Regular heart rhythm.  Pulmonary/Chest: Effort normal.   Abdominal: No distension.  + epigastric tenderness  Musculoskeletal: Normal range of motion. No edema.   Neurological: Alert and oriented to person, place, and time.   Skin: Skin is warm and dry.   Psychiatric: Normal mood and affect. Behavior is normal.                  Vital Signs  Temp: 98.2 °F (36.8 °C) (12/16/23 0750)  Pulse: 97 (12/16/23 0750)  Resp: 18 (12/16/23 0852)  BP: 129/84 (12/16/23 0750)  SpO2: 100 % (12/16/23 0750)     24 Hour VS Range    Temp:  [98 °F (36.7 °C)-99.1 °F (37.3 °C)]   Pulse:  []   Resp:  [16-18]   BP: (116-165)/(68-97)   SpO2:  [94  "%-100 %]     Intake/Output Summary (Last 24 hours) at 12/16/2023 1048  Last data filed at 12/15/2023 1818  Gross per 24 hour   Intake 800 ml   Output no documentation   Net 800 ml           I/O This Shift:  No intake/output data recorded.    Wt Readings from Last 3 Encounters:   12/12/23 52 kg (114 lb 10.2 oz)       I have personally reviewed the vitals and recorded Intake/Output     Laboratory/Diagnostic Data:    CBC/Anemia Labs: Coags:    Recent Labs   Lab 12/13/23  0322 12/14/23  0612 12/15/23  0345 12/16/23  0604   WBC 17.17* 8.27 4.36 4.80   HGB 8.5* 9.6* 9.6* 9.0*   HCT 24.9* 27.5* 29.1* 27.3*    260 244 216   MCV 72* 70* 75* 75*   RDW 15.4* 15.7* 15.5* 15.6*   IRON 71  --   --   --    FERRITIN 32  --   --   --     No results for input(s): "PT", "INR", "APTT" in the last 168 hours.     Chemistries: ABG:   Recent Labs   Lab 12/14/23  0612 12/15/23  0345 12/15/23  1828 12/16/23  0604    131* 134* 132*   K 3.9 4.5 4.3 4.5    97 96 98   CO2 28 27 25 29   BUN 13 13 13 16   CREATININE 0.7 0.7 0.9 0.8   CALCIUM 9.0 9.1 9.6 9.1   PROT 6.4 6.2  --  5.8*   BILITOT 0.3 0.4  --  0.2   ALKPHOS 178* 174*  --  150*   ALT 31 26  --  21   AST 34 32  --  21   MG 2.2 2.1  --  1.9   PHOS 2.4* 2.9 2.7 3.3    Recent Labs   Lab 12/12/23  0717   PH 7.119*   PCO2 21.1*   PO2 42   HCO3 6.8*   POCSATURATED 63   BE -23*        POCT Glucose: HbA1c:    Recent Labs   Lab 12/15/23  0754 12/15/23  1225 12/15/23  1634 12/15/23  2047 12/16/23  0212 12/16/23  0752   POCTGLUCOSE 281* 265* 154* 225* 349* 369*    Hemoglobin A1C   Date Value Ref Range Status   12/12/2023 13.6 (H) 4.0 - 5.6 % Final     Comment:     ADA Screening Guidelines:  5.7-6.4%  Consistent with prediabetes  >or=6.5%  Consistent with diabetes    High levels of fetal hemoglobin interfere with the HbA1C  assay. Heterozygous hemoglobin variants (HbS, HgC, etc)do  not significantly interfere with this assay.   However, presence of multiple variants may affect " "accuracy.          Cardiac Enzymes: Ejection Fractions:    Recent Labs     12/14/23  1029   TROPONINI 0.014    No results found for: "EF"       No results for input(s): "COLORU", "APPEARANCEUA", "PHUR", "SPECGRAV", "PROTEINUA", "GLUCUA", "KETONESU", "BILIRUBINUA", "OCCULTUA", "NITRITE", "UROBILINOGEN", "LEUKOCYTESUR", "RBCUA", "WBCUA", "BACTERIA", "SQUAMEPITHEL", "HYALINECASTS" in the last 48 hours.    Invalid input(s): "WRIGHTSUR"      Lactate (Lactic Acid) (mmol/L)   Date Value   12/13/2023 0.9   12/12/2023 4.0 (HH)     No results found for: "BNP"  No results found for: "CRP", "SEDRATE"  No results found for: "DDIMER"  Ferritin (ng/mL)   Date Value   12/13/2023 32     No results found for: "LDH"  Troponin I (ng/mL)   Date Value   12/14/2023 0.014     CPK (U/L)   Date Value   12/12/2023 80     No results found for this or any previous visit.  SARS-CoV2 (COVID-19) Qualitative PCR (no units)   Date Value   12/12/2023 Not Detected       Microbiology labs for the last week  Microbiology Results (last 7 days)       Procedure Component Value Units Date/Time    Blood Culture #1 **CANNOT BE ORDERED STAT** [[id number]] Collected: 12/12/23 0810    Order Status: Completed Specimen: Blood from Peripheral, Antecubital, Left Updated: 12/16/23 1012     Blood Culture, Routine No Growth to date      No Growth to date      No Growth to date      No Growth to date      No Growth to date    Blood Culture #2 **CANNOT BE ORDERED STAT** [[id number]] Collected: 12/12/23 0810    Order Status: Completed Specimen: Blood from Peripheral, Antecubital, Right Updated: 12/16/23 1012     Blood Culture, Routine No Growth to date      No Growth to date      No Growth to date      No Growth to date      No Growth to date            Reviewed and noted in plan where applicable- Please see chart for full lab data.    Lines/Drains:       Peripheral IV - Single Lumen 12/12/23 1152 18 G Right Antecubital (Active)   Site Assessment Clean;Dry;Intact 12/14/23 " 0345   Extremity Assessment Distal to IV No abnormal discoloration 12/13/23 2000   Line Status Flushed 12/13/23 2000   Dressing Status Clean;Dry;Intact 12/13/23 2000   Dressing Intervention Integrity maintained 12/13/23 2000   Dressing Change Due 12/16/23 12/13/23 2000   Site Change Due 12/16/23 12/13/23 2000   Reason Not Rotated Not due 12/13/23 2000   Number of days: 1            Peripheral IV - Single Lumen 12/12/23 1901 20 G Left Wrist (Active)   Site Assessment Clean;Dry;Intact 12/14/23 0346   Extremity Assessment Distal to IV No abnormal discoloration 12/13/23 2000   Line Status Flushed 12/13/23 2000   Dressing Status Dry;Clean;Intact 12/13/23 2000   Dressing Intervention Integrity maintained 12/13/23 2000   Dressing Change Due 12/16/23 12/13/23 2000   Site Change Due 12/16/23 12/13/23 2000   Reason Not Rotated Not due 12/13/23 2000   Number of days: 1       Imaging  ECG Results              EKG 12-lead (Final result)  Result time 12/12/23 10:43:46      Final result by Interface, Lab [facility] (12/12/23 10:43:46)               Narrative:    Test Reason : R73.9,    Vent. Rate : 113 BPM     Atrial Rate : 113 BPM     P-R Int : 162 ms          QRS Dur : 088 ms      QT Int : 350 ms       P-R-T Axes : 074 090 039 degrees     QTc Int : 480 ms    Probably  Sinus tachycardia  Possible Left atrial enlargement  Rightward axis  Possible  Anteroseptal infarct ,age undetermined  Abnormal ECG  No previous ECGs available  Confirmed by Venkat AGUILAR MD ([id number]) on 12/12/2023 10:43:36 AM    Referred By: [email or web address]   SELF           Confirmed By:Venkat AGUILAR MD                                  No results found for this or any previous visit.      CT Abdomen Pelvis  Without Contrast  Narrative: EXAMINATION:  CT ABDOMEN PELVIS WITHOUT CONTRAST    CLINICAL HISTORY:  abd pain;    TECHNIQUE:  Low dose axial images, sagittal and coronal reformations were obtained from the lung bases to the pubic symphysis.  Oral contrast was not  administered.    COMPARISON:  Retroperitoneal ultrasound 12/13/2023, abdominal x-ray 12/14/2023    FINDINGS:  SOFT TISSUES: Small foci of subcutaneous emphysema in the lower abdominal wall, likely injection related.    MEDIASTINUM: Heart is normal size. No pericardial effusion.    LUNG BASES: Unremarkable.    HEPATOBILIARY: Liver is enlarged measuring 19.5 cm.  No focal hepatic lesions. No biliary ductal dilatation. Normal gallbladder.    PANCREAS: No focal masses or ductal dilatation.    SPLEEN: Normal size.    ADRENALS: No adrenal nodules.    KIDNEYS/URETERS: Kidneys are normal size and location.  No nephrolithiasis or hydronephrosis. No solid mass lesions. Ureters are unremarkable.    BLADDER/PELVIC ORGANS: Unremarkable.    PERITONEUM / RETROPERITONEUM: No free air or fluid.    LYMPH NODES: No lymphadenopathy.    VESSELS: Unremarkable.    GI TRACT: Stomach and duodenum are unremarkable.  No evidence of bowel obstruction or inflammation.  Appendix is not visualized and there is a suture line in the right lower quadrant, suggesting appendectomy.    BONES: No acute fractures or suspicious osseous lesions.  Impression: Hepatomegaly.    No other significant abnormality.    Electronically signed by resident: Ivan Shanks  Date:    12/15/2023  Time:    09:08    Electronically signed by: Bruno Townsend  Date:    12/15/2023  Time:    11:02      Labs, Imaging, EKG and Diagnostic results from 12/16/2023 were reviewed.    Medications:  Medication list was reviewed and changes noted under Assessment/Plan.  No current facility-administered medications on file prior to encounter.     No current outpatient medications on file prior to encounter.     Scheduled Medications:  acetaminophen, 1,000 mg, Oral, Q8H  aluminum-magnesium hydroxide-simethicone, 30 mL, Oral, QID (AC & HS)  cetirizine, 5 mg, Oral, Daily  doxycycline, 100 mg, Oral, Q12H  enoxparin, 30 mg, Subcutaneous, Q24H (prophylaxis, 1700)  insulin aspart U-100, 4 Units,  Subcutaneous, TIDWM  insulin detemir U-100, 6 Units, Subcutaneous, BID  lactated ringers, 1,000 mL, Intravenous, Once  mirtazapine, 15 mg, Oral, Nightly  nicotine, 1 patch, Transdermal, Daily  pantoprazole, 40 mg, Oral, Daily      PRN: dextrose 10%, dextrose 10%, glucagon (human recombinant), glucose, glucose, insulin aspart U-100, ondansetron, oxyCODONE, senna-docusate 8.6-50 mg, sodium chloride 0.9%, sodium chloride 0.9%, sodium chloride 0.9%  Infusions:       Estimated Creatinine Clearance: 77.5 mL/min (based on SCr of 0.8 mg/dL).             Assessment/Plan:      * Severe sepsis    36 yoF with unknown past medical history presented with tachycardia, tachypnea, elevated WBC, and elevated lactic in the setting of DKA. Unclear source of infection as UA and CXR were unremarkable. History was minimal as patient is encephalopathic. Likely source is multiple skin infections noted on patient.      - d/c vanc and zosyn and start doxycycline  - f/u Bcx and tailor antibiotics    12/14 BCX 2 NGTD. on doxycycline         Methamphetamine abuse  12/15 was on adderall 30 mg bid, stopped 2 months back as her psychiatrist not available for prescription.  since then smoking meth twice daily. psychiatry consulted for Methamphetamine use/ ADHD.  Nicotine patch for smoking. counseled regarding cessation  psychiatry consulted for Methamphetamine use/ ADHD. needs addiction rehab program after discharge       Hyponatremia  sodium trending down 131 .  .likely poor oral intake   Recent Labs   Lab 12/16/23  0604   *       Diabetes mellitus with hyperglycemia        Acute metabolic encephalopathy    Likely 2/2 to sepsis or DKA.      - continue monitoring    12/14 U tox with amphetamines. TSH WNL. addiction psychiatry eval        Lactic acidosis  resolved     High anion gap metabolic acidosis    Likely 2/2 DKA vs lactic acidosis. resolved        ANNA (acute kidney injury)    Cr 2.0. likely prerenal ANNA in the setting of dehydration and  DKA.      - IVF and resume diet  - trend Cr  - avoid nephrotoxic medication and renally dose   resolved    Recent Labs   Lab 12/15/23  0345 12/15/23  1828 12/16/23  0604   BUN 13 13 16   CREATININE 0.7 0.9 0.8       I & O     Intake/Output Summary (Last 24 hours) at 12/16/2023 0820  Last data filed at 12/15/2023 1818  Gross per 24 hour   Intake 1050 ml   Output no documentation   Net 1050 ml        Microcytic anemia    Hgb of 10 and MCV of 77. No evidence of bleeding     - trend Hgb and transfuse <7       Recent Labs   Lab 12/14/23  0612 12/15/23  0345 12/16/23  0604   HGB 9.6* 9.6* 9.0*           Component Value Date/Time    MCV 75 (L) 12/16/2023 0604    RDW 15.6 (H) 12/16/2023 0604    IRON 71 12/13/2023 0322    FERRITIN 32 12/13/2023 0322     Monitor CBC and transfuse if H/H drops below 7/21.      DKA (diabetic ketoacidosis)    BG >500 on multiple POCT glucose. BHB elevated. A1c of 13.6. repeat labs showed closed gap and acidosis mostly resolved.      - patient transitioned to basal bolus insulin  - BMP q4  - replete K as needed  - nutrition consulted    Patient's FSGs are controlled on current medication regimen.  Last A1c reviewed-   Lab Results   Component Value Date    HGBA1C 13.6 (H) 12/12/2023     Most recent fingerstick glucose reviewed-   Recent Labs   Lab 12/15/23  1634 12/15/23  2047 12/16/23  0212 12/16/23  0752   POCTGLUCOSE 154* 225* 349* 369*       Antihyperglycemics (From admission, onward)      Start     Stop Route Frequency Ordered    12/16/23 0900  insulin detemir U-100 (Levemir) pen 6 Units         -- SubQ 2 times daily 12/15/23 1109    12/14/23 1130  insulin aspart U-100 pen 4 Units         -- SubQ 3 times daily with meals 12/14/23 0919    12/14/23 1017  insulin aspart U-100 pen 0-10 Units         -- SubQ Before meals & nightly PRN 12/14/23 0919    12/12/23 1245  insulin regular in 0.9 % NaCl 100 unit/100 mL (1 unit/mL) infusion        Question Answer Comment   Initial dose (DO NOT CHANGE): 0.1  units/kg/hr    Insulin Rate Adjustment (DO NOT MODIFY ANSWER) [email or web address]\epic\Images\Pharmacy\InsulinInfusions\INSULIN ADJUSTMENT DKA version [id number]        12/13/23 0130 IV Continuous 12/12/23 1235          12/14 Endocrine consulted       VTE Risk Mitigation (From admission, onward)           Ordered     enoxaparin injection 30 mg  Every 24 hours         12/14/23 1246     IP VTE HIGH RISK PATIENT  Once         12/12/23 1231                    Discharge Planning   OUMOU: 12/18/2023     Code Status: Full Code   Is the patient medically ready for discharge?: (No Documentation)    Reason for patient still in hospital (select all that apply): Treatment  Discharge Plan A: Home with family                  Chris Mcdaniel MD  Department of Hospital Medicine   [facility] - Telemetry Stepdown

## 2023-12-16 NOTE — PLAN OF CARE
DUANE met with pt and friend at bedside to provide pt out pt mental health and substance abuse resources including [facility] outpt information. The friend at bedside asked if the pt was going straight to a facility after discharge since she saw the psychiatrist. I explained that the pt was not PEC'ed and the recommendation was volunteer outpt services which the pt must obtain on her own through this list and showed her the numbers to call. The pt stated that she has in home mental health services from the [facility] . The lady at bedside stated [facility] and she provides in home home health services to the pt in the home. I asked if she needed home health resumption orders or a HH packet from us and she stated that she doesn't need anything, it is already set up. DUANE following for additional needs.    12/16/23 1032   Discharge Assessment   Assessment Type Discharge Planning Reassessment

## 2023-12-16 NOTE — ASSESSMENT & PLAN NOTE
Likely 2/2 to sepsis or DKA.      - continue monitoring    12/14 U tox with amphetamines. TSH WNL. addiction psychiatry eval

## 2023-12-16 NOTE — PLAN OF CARE
Problem: Adult Inpatient Plan of Care  Goal: Plan of Care Review  Outcome: Ongoing, Progressing  Goal: Patient-Specific Goal (Individualized)  Outcome: Ongoing, Progressing  Goal: Absence of Hospital-Acquired Illness or Injury  Outcome: Ongoing, Progressing  Goal: Optimal Comfort and Wellbeing  Outcome: Ongoing, Progressing  Goal: Readiness for Transition of Care  Outcome: Ongoing, Progressing     Problem: Infection  Goal: Absence of Infection Signs and Symptoms  Outcome: Ongoing, Progressing     Problem: Glycemic Control Impaired (Sepsis/Septic Shock)  Goal: Blood Glucose Level Within Desired Range  Outcome: Ongoing, Progressing

## 2023-12-16 NOTE — ASSESSMENT & PLAN NOTE
BG >500 on multiple POCT glucose. BHB elevated. A1c of 13.6. repeat labs showed closed gap and acidosis mostly resolved.      - patient transitioned to basal bolus insulin  - BMP q4  - replete K as needed  - nutrition consulted    Patient's FSGs are controlled on current medication regimen.  Last A1c reviewed-   Lab Results   Component Value Date    HGBA1C 13.6 (H) 12/12/2023     Most recent fingerstick glucose reviewed-   Recent Labs   Lab 12/15/23  1634 12/15/23  2047 12/16/23  0212 12/16/23  0752   POCTGLUCOSE 154* 225* 349* 369*       Antihyperglycemics (From admission, onward)      Start     Stop Route Frequency Ordered    12/16/23 0900  insulin detemir U-100 (Levemir) pen 6 Units         -- SubQ 2 times daily 12/15/23 1109    12/14/23 1130  insulin aspart U-100 pen 4 Units         -- SubQ 3 times daily with meals 12/14/23 0919    12/14/23 1017  insulin aspart U-100 pen 0-10 Units         -- SubQ Before meals & nightly PRN 12/14/23 0919    12/12/23 1245  insulin regular in 0.9 % NaCl 100 unit/100 mL (1 unit/mL) infusion        Question Answer Comment   Initial dose (DO NOT CHANGE): 0.1 units/kg/hr    Insulin Rate Adjustment (DO NOT MODIFY ANSWER) [email or web address]\epic\Images\Pharmacy\InsulinInfusions\INSULIN ADJUSTMENT DKA version [id number].pdf        12/13/23 0130 IV Continuous 12/12/23 1235          12/14 Endocrine consulted

## 2023-12-16 NOTE — ASSESSMENT & PLAN NOTE
Hgb of 10 and MCV of 77. No evidence of bleeding     - trend Hgb and transfuse <7       Recent Labs   Lab 12/14/23  0612 12/15/23  0345 12/16/23  0604   HGB 9.6* 9.6* 9.0*           Component Value Date/Time    MCV 75 (L) 12/16/2023 0604    RDW 15.6 (H) 12/16/2023 0604    IRON 71 12/13/2023 0322    FERRITIN 32 12/13/2023 0322     Monitor CBC and transfuse if H/H drops below 7/21.

## 2023-12-16 NOTE — PROGRESS NOTES
"[facility] - Tele[facility] Stepdown  Endocrinology  Progress Note    Admit Date: 2023     Reason for Consult: Management of T1DM vd T2DM, Hyperglycemia       Diabetes diagnosis year: > 5 years ago    Home Diabetes Medications:  Tandem T slim    How often checking glucose at home? >4 x day Dexcom  BG readings on regimen: 100s-300s  Hypoglycemia on the regimen?  No  Missed doses on regimen?  Yes reports forgot to put it back on.    Diabetes Complications include:     Hyperglycemia    Complicating diabetes co morbidities:   Denies      HPI:   Patient is a 36 y.o. female with diabetes who presented to the ED for altered mental status.  In the ED, patient was tachycardia, tachypnea, elevated WBC, and elevated lactic in the setting of DKA. Unclear source of infection as UA and CXR were unremarkable. Patient was admitted to ICU for further management. DKA resolved on insulin drip.  Endo consulted for bg management.  Pt reports that she typically wears tandem pump but forgot to put it back on.  She could not remember how long it had been off.          Interval HPI:   No acute events overnight.  Nurse reports pt eating all night. Patient in room 8084/8084 A. Blood glucose variable. BG at and above goal on current insulin regimen (SSI, prandial, and basal insulin ). Steroid use- None .      Renal function- Normal   Vasopressors-  None     Diet diabetic 1500 Calorie; Standard Tray     Eatin%  Nausea: No  Hypoglycemia and intervention: No  Fever: No  TPN and/or TF: No    /84 (BP Location: Left arm, Patient Position: Lying)   Pulse 97   Temp 98.2 °F (36.8 °C) (Oral)   Resp 18   Ht 5' 6" (1.676 m)   Wt 52 kg (114 lb 10.2 oz)   SpO2 100%   BMI 18.50 kg/m²     Labs Reviewed and Include    Recent Labs   Lab 23  0604   *   CALCIUM 9.1   ALBUMIN 2.6*   PROT 5.8*   *   K 4.5   CO2 29   CL 98   BUN 16   CREATININE 0.8   ALKPHOS 150*   ALT 21   AST 21   BILITOT 0.2     Lab Results   Component Value " "Date    WBC 4.80 12/16/2023    HGB 9.0 (L) 12/16/2023    HCT 27.3 (L) 12/16/2023    MCV 75 (L) 12/16/2023     12/16/2023     Recent Labs   Lab 12/13/23  0322   TSH 0.428     Lab Results   Component Value Date    HGBA1C 13.6 (H) 12/12/2023       Nutritional status:   Body mass index is 18.5 kg/m².  Lab Results   Component Value Date    ALBUMIN 2.6 (L) 12/16/2023    ALBUMIN 2.9 (L) 12/15/2023    ALBUMIN 2.7 (L) 12/15/2023     No results found for: "PREALBUMIN"    Estimated Creatinine Clearance: 77.5 mL/min (based on SCr of 0.8 mg/dL).    Accu-Checks  Recent Labs     12/14/23  1153 12/14/23  1655 12/14/23  2119 12/15/23  0204 12/15/23  0754 12/15/23  1225 12/15/23  1634 12/15/23  2047 12/16/23  0212 12/16/23  0752   POCTGLUCOSE 370* 237* 110 181* 281* 265* 154* 225* 349* 369*       Current Medications and/or Treatments Impacting Glycemic Control  Immunotherapy:    Immunosuppressants       None          Steroids:   Hormones (From admission, onward)      None          Pressors:    Autonomic Drugs (From admission, onward)      None          Hyperglycemia/Diabetes Medications:   Antihyperglycemics (From admission, onward)      Start     Stop Route Frequency Ordered    12/16/23 0900  insulin detemir U-100 (Levemir) pen 6 Units         -- SubQ 2 times daily 12/15/23 1109    12/14/23 1130  insulin aspart U-100 pen 4 Units         -- SubQ 3 times daily with meals 12/14/23 0919    12/14/23 1017  insulin aspart U-100 pen 0-10 Units         -- SubQ Before meals & nightly PRN 12/14/23 0919    12/12/23 1245  insulin regular in 0.9 % NaCl 100 unit/100 mL (1 unit/mL) infusion        Question Answer Comment   Initial dose (DO NOT CHANGE): 0.1 units/kg/hr    Insulin Rate Adjustment (DO NOT MODIFY ANSWER) [email or web address] [email or web address] DKA version [email or web address]        12/13/23 0130 IV Continuous 12/12/23 1235            ASSESSMENT and PLAN    Renal/  ANNA (acute kidney injury)  Titrate " insulin slowly to avoid hypoglycemia as the risk of hypoglycemia increases with decreased creatinine clearance.        ID  * Severe sepsis  Managed per primary team      Endocrine  Diabetes mellitus with hyperglycemia  BG goal: 140-180   Phenotypically  and presentation with DKA more consistent with T1DM. Pt not cooperative during interview as she reports she has told everyone, everything already. On insulin tandem pump at home, she reports taking it off but unclear when.  Comes in with DKA which has since resolved. Her cousin delivered tandem pump and dexcom.  BG elevating overnight, but pt reportedly eating all the time overnight.    - Continue Levemir 6 units BID .  - Continue Novolog 4 units AC,   - Continue Novolog Moderate dose correction with ISF 25 starting at 150    - POCT Glucose before meals, at bedtime and at 2 am  - Hypoglycemia protocol in place        ** Please notify Endocrine for any change and/or advance in diet**  ** Please call Endocrine for any BG related issues **     Discharge Planning:   Pt had been on Tandem w/o control IQ.  Restarted her on new Tandem w/ control IQ that she just received, using prev settings    Basal 0.9  ICR 1:9  ISF 1:35  She will f/u with her provider at [facility] this week she reports.          Jose Bradley PA-C  Endocrinology  [facility]

## 2023-12-16 NOTE — PLAN OF CARE
[facility] - Tele[facility]      HOME HEALTH ORDERS  FACE TO FACE ENCOUNTER    Patient Name: Wilfrido Elias  YOB: 1985    PCP: No, Primary Doctor   PCP Address: None  PCP Phone Number: None  PCP Fax: None    Encounter Date: 12/12/23    Admit to Home Health    Diagnoses:  Active Hospital Problems    Diagnosis  POA    *Severe sepsis [A41.9, R65.20]  Yes    Hyponatremia [E87.1]  Unknown    Methamphetamine abuse [F15.10]  Yes    Diabetes mellitus with hyperglycemia [E11.65]  Yes    DKA (diabetic ketoacidosis) [E11.10]  Yes    Microcytic anemia [D50.9]  Yes    ANNA (acute kidney injury) [N17.9]  Yes    High anion gap metabolic acidosis [E87.29]  Yes    Lactic acidosis [E87.20]  Yes    Acute metabolic encephalopathy [G93.41]  Yes      Resolved Hospital Problems    Diagnosis Date Resolved POA    Hyperkalemia [E87.5] 12/14/2023 Yes    Hyperphosphatemia [E83.39] 12/14/2023 Yes       Follow Up Appointments:  No future appointments.                                                                                                                                                                                                                                                                                                                                                                                                                                                                                                                                                                                                                                                                                                                                                                                                                                                                                                                                                                  Allergies:Review of patient's allergies indicates:  Not on  File    Medications: Review discharge medications with patient and family and provide education.    Current Facility-Administered Medications   Medication Dose Route Frequency Provider Last Rate Last Admin    acetaminophen tablet 1,000 mg  1,000 mg Oral Q8H Chris Mcdaniel MD   1,000 mg at 12/16/23 0619    aluminum-magnesium hydroxide-simethicone 200-200-20 mg/5 mL suspension 30 mL  30 mL Oral QID (AC & HS) Chris Mcdaniel MD   30 mL at 12/16/23 1212    cetirizine tablet 5 mg  5 mg Oral Daily Chris Mcdaniel MD   5 mg at 12/16/23 1212    dextrose 10% bolus 125 mL 125 mL  12.5 g Intravenous PRN Jose Bradley PA-C        dextrose 10% bolus 250 mL 250 mL  25 g Intravenous PRN Jose Bradley PA-C        doxycycline tablet 100 mg  100 mg Oral Q12H Mahamed Hernandez MD   100 mg at 12/16/23 0852    enoxaparin injection 30 mg  30 mg Subcutaneous Q24H (prophylaxis, 1700) Chris Mcdaniel MD   30 mg at 12/15/23 1748    glucagon (human recombinant) injection 1 mg  1 mg Intramuscular PRN Jose Bradley PA-C        glucose chewable tablet 16 g  16 g Oral JUAN MN Jose Bradley PA-C        glucose chewable tablet 24 g  24 g Oral PRN Jose Bradley PA-C        insulin aspart U-100 pen 0-10 Units  0-10 Units Subcutaneous QID (AC + HS) PRN Jose Bradley PA-C   4 Units at 12/16/23 1213    insulin aspart U-100 pen 4 Units  4 Units Subcutaneous TIDWM Jose Bradley PA-C   4 Units at 12/16/23 1212    insulin detemir U-100 (Levemir) pen 6 Units  6 Units Subcutaneous BID Jose Bradley PA-C   6 Units at 12/16/23 0800    lactated ringers bolus 1,000 mL  1,000 mL Intravenous Once Noah Leonard MD        mirtazapine disintegrating tablet 15 mg  15 mg Oral Nightly Mahamed Hernandez MD   15 mg at 12/15/23 2049    nicotine 7 mg/24 hr 1 patch  1 patch Transdermal Daily Chris Mcdaniel MD   1 patch at 12/16/23 7752    ondansetron  injection 4 mg  4 mg Intravenous Q6H PRN Mónica Stringer MD   4 mg at 12/16/23 0619    oxyCODONE immediate release tablet 5 mg  5 mg Oral Q6H PRN Chris Mcdaniel MD   5 mg at 12/16/23 0852    pantoprazole EC tablet 40 mg  40 mg Oral Daily Chris Mcdaniel MD   40 mg at 12/16/23 0852    senna-docusate 8.6-50 mg per tablet 1 tablet  1 tablet Oral Daily PRN Chris Mcdaniel MD        sodium chloride 0.9% flush 10 mL  10 mL Intravenous PRN Silvestre Pulliam MD        sodium chloride 0.9% flush 10 mL  10 mL Intravenous PRN Mónica Stringer MD        sodium chloride 0.9% flush 10 mL  10 mL Intravenous PRN Mónica Stringer MD         Current Discharge Medication List        START taking these medications    Details   acetaminophen (TYLENOL) 500 MG tablet Take 2 tablets (1,000 mg total) by mouth every 8 (eight) hours as needed for Pain.  Qty: 42 tablet, Refills: 0      albuterol (PROVENTIL) 2.5 mg /3 mL (0.083 %) nebulizer solution Take 3 mLs (2.5 mg total) by nebulization every 8 (eight) hours as needed for Wheezing. Rescue  Refills: 0      aluminum-magnesium hydroxide-simethicone (MAALOX) 200-200-20 mg/5 mL Susp Take 30 mLs by mouth every 6 (six) hours as needed.  Qty: 355 mL, Refills: 0      amLODIPine (NORVASC) 2.5 MG tablet Take 2 tablets (5 mg total) by mouth once daily.  Qty: 60 tablet, Refills: 11    Comments: .      cetirizine (ZYRTEC) 10 MG tablet Take 1 tablet (10 mg total) by mouth once daily.  Qty: 30 tablet, Refills: 0      dicyclomine (BENTYL) 20 mg tablet Take 1 tablet (20 mg total) by mouth every 6 (six) hours as needed (abdominal pain or cramping).  Qty: 120 tablet, Refills: 0      doxycycline (VIBRA-TABS) 100 MG tablet Take 1 tablet (100 mg total) by mouth every 12 (twelve) hours. for 7 doses  Qty: 7 tablet, Refills: 0      ferrous sulfate (FEROSUL) 220 mg (44 mg iron)/5 mL Elix Take 5 mLs (220 mg total) by mouth once daily.      insulin lispro 100 unit/mL injection Pt to use insulin lispro in her  insulin pump as needed.  Total max daily dose 100  Qty: 30 mL, Refills: 11      nicotine (NICODERM CQ) 7 mg/24 hr Place 1 patch onto the skin once daily.  Qty: 28 patch, Refills: 2      ondansetron (ZOFRAN) 4 MG tablet Take 2 tablets (8 mg total) by mouth every 6 (six) hours as needed for Nausea.  Qty: 40 tablet, Refills: 0      oxyCODONE (ROXICODONE) 5 MG immediate release tablet Take 1 tablet (5 mg total) by mouth every 6 (six) hours as needed.  Qty: 20 tablet, Refills: 0    Comments: Quantity prescribed more than 7 day supply? No      pantoprazole (PROTONIX) 40 MG tablet Take 1 tablet (40 mg total) by mouth once daily.  Qty: 30 tablet, Refills: 1      senna-docusate 8.6-50 mg (PERICOLACE) 8.6-50 mg per tablet Take 1 tablet by mouth daily as needed for Constipation.  Qty: 7 tablet, Refills: 0               I have seen and examined this patient within the last 30 days. My clinical findings that support the need for the home health skilled services and home bound status are the following:no   Weakness/numbness causing balance and gait disturbance due to Weakness/Debility making it taxing to leave home.     Diet:   diabetic diet 2000 calorie      Referrals/ Consults  Aide to provide assistance with personal care, ADLs, and vital signs.    Activities:   activity as tolerated    Nursing:   Agency to admit patient within 24 hours of hospital discharge unless specified on physician order or at patient request    SN to complete comprehensive assessment including routine vital signs. Instruct on disease process and s/s of complications to report to MD. Review/verify medication list sent home with the patient at time of discharge  and instruct patient/caregiver as needed. Frequency may be adjusted depending on start of care date.     Skilled nurse to perform up to 3 visits PRN for symptoms related to diagnosis    Notify MD if SBP > 160 or < 90; DBP > 90 or < 50; HR > 120 or < 50; Temp > 101; O2 < 88%;     Ok to schedule  additional visits based on staff availability and patient request on consecutive days within the home health episode.    When multiple disciplines ordered:    Start of Care occurs on Sunday - Wednesday schedule remaining discipline evaluations as ordered on separate consecutive days following the start of care.    Thursday SOC -schedule subsequent evaluations Friday and Monday the following week.     Friday - Saturday SOC - schedule subsequent discipline evaluations on consecutive days starting Monday of the following week.    For all post-discharge communication and subsequent orders please contact patient's primary care physician. If unable to reach primary care physician or do not receive response within 30 minutes, please contact [phone] for clinical staff order clarification    Miscellaneous   Routine Skin for Bedridden Patients: Instruct patient/caregiver to apply moisture barrier cream to all skin folds and wet areas in perineal area daily and after baths and all bowel movements.    Home Health Aide:  Nursing every 48 hours, Physical Therapy every 48 hours, Occupational Therapy every 48 hours, and Home Health Aide every 48 hours    Wound Care Orders  no    I certify that this patient is confined to her home and needs intermittent skilled nursing care,

## 2023-12-17 LAB
BACTERIA BLD CULT: NORMAL
BACTERIA BLD CULT: NORMAL

## 2024-01-04 ENCOUNTER — PATIENT OUTREACH (OUTPATIENT)
Dept: ADMINISTRATIVE | Facility: OTHER | Age: 39
End: 2024-01-04
Payer: MEDICAID

## 2024-01-04 NOTE — PROGRESS NOTES
CHW - Initial Contact    This Community Health Worker completed the Social Determinant of Health questionnaire with MRN [id number] over the phone today.    Pt identified barriers of most importance are: No barriers reported   Referrals to community agencies completed with patient/caregiver consent outside of [facility] include: No   Referrals were put through [facility] - No   Support and Services: No support & services have been documented.  Other information discussed the patient needs / wants help with: SDOH completed. No assistance requested at this time.   Follow up required: No   No future outreach task assigned

## 2024-03-18 PROBLEM — A41.9 SEVERE SEPSIS: Status: RESOLVED | Noted: 2023-12-12 | Resolved: 2024-03-18

## 2024-03-18 PROBLEM — R65.20 SEVERE SEPSIS: Status: RESOLVED | Noted: 2023-12-12 | Resolved: 2024-03-18

## 2024-03-18 PROBLEM — N17.9 AKI (ACUTE KIDNEY INJURY): Status: RESOLVED | Noted: 2023-12-12 | Resolved: 2024-03-18

## 2024-03-18 PROBLEM — E11.10 DKA (DIABETIC KETOACIDOSIS): Status: RESOLVED | Noted: 2023-12-12 | Resolved: 2024-03-18
